# Patient Record
Sex: MALE | Race: BLACK OR AFRICAN AMERICAN
[De-identification: names, ages, dates, MRNs, and addresses within clinical notes are randomized per-mention and may not be internally consistent; named-entity substitution may affect disease eponyms.]

---

## 2017-08-29 ENCOUNTER — HOSPITAL ENCOUNTER (EMERGENCY)
Dept: HOSPITAL 76 - ED | Age: 42
Discharge: HOME | End: 2017-08-29
Payer: COMMERCIAL

## 2017-08-29 VITALS — SYSTOLIC BLOOD PRESSURE: 158 MMHG | DIASTOLIC BLOOD PRESSURE: 98 MMHG

## 2017-08-29 DIAGNOSIS — Z96.659: ICD-10-CM

## 2017-08-29 DIAGNOSIS — R03.0: ICD-10-CM

## 2017-08-29 DIAGNOSIS — M06.9: ICD-10-CM

## 2017-08-29 DIAGNOSIS — M25.512: Primary | ICD-10-CM

## 2017-08-29 PROCEDURE — 96372 THER/PROPH/DIAG INJ SC/IM: CPT

## 2017-08-29 PROCEDURE — 99283 EMERGENCY DEPT VISIT LOW MDM: CPT

## 2017-08-29 NOTE — ED PHYSICIAN DOCUMENTATION
PD HPI UPPER EXT INJURY





- Stated complaint


Stated Complaint: L ARM PAIN





- Chief complaint


Chief Complaint: Ext Problem





- History obtained from


History obtained from: Patient





- Additonal information


Additional information: 





42-year-old gentleman with history of mild intermittent rheumatoid arthritis 

presents with atraumatic left shoulder pain of 2 days duration.  It is similar 

to previous exacerbation of rheumatoid arthritis.  He has severe pain with 

range of motion.  There is no fever associated with this.  He does feel like 

his hand and wrist are swollen as well.





Review of Systems


Constitutional: denies: Fever, Chills, Fatigue, Weight Loss


Respiratory: denies: Dyspnea, Cough


GI: denies: Abdominal Pain





PD PAST MEDICAL HISTORY





- Past Medical History


Past Medical History: No


Musculoskeletal: Osteoarthritis, Rheumatoid arthritis, Gout





- Past Surgical History


Past Surgical History: Yes


Ortho: Knee replacement





- Present Medications


Home Medications: 


 Ambulatory Orders











 Medication  Instructions  Recorded  Confirmed


 


Ibuprofen 600 mg PO Q6HR PRN #30 tablet 01/30/16 08/29/17














- Allergies


Allergies/Adverse Reactions: 


 Allergies











Allergy/AdvReac Type Severity Reaction Status Date / Time


 


No Known Drug Allergies Allergy   Verified 08/29/17 21:29














- Social History


Does the pt smoke?: No


Smoking Status: Never smoker


Does the pt drink ETOH?: No


Does the pt have substance abuse?: No





- Immunizations


Immunizations are current?: Yes





- POLST


Patient has POLST: No





PD ED PE NORMAL





- Vitals


Vital signs reviewed: Yes





- General


General: Alert and oriented X 3, No acute distress





- Neck


Neck: Supple, no meningeal sign, No bony TTP





- Extremities


Extremities: Other (Diffuse tenderness To the left shoulder joint without 

warmth.  No overlying skin changes or redness.  He can internally and 

externally rotate a few degrees without pain but cannot really abduct at all.  

I do not really see any swelling to the arm per se and he has no tenderness of 

the hand or wrist.)





- Neuro


Neuro: Alert and oriented X 3, Normal speech





- Psych


Psych: Normal mood, Normal affect





Results





- Vitals


Vitals: 


 Vital Signs - 24 hr











  08/29/17





  21:30


 


Temperature 36.6 C


 


Heart Rate 98


 


Respiratory 18





Rate 


 


Blood Pressure 176/98 H


 


O2 Saturation 98








 Oxygen











O2 Source                      Room air

















PD MEDICAL DECISION MAKING





- ED course


ED course: 





42-year-old gentleman with an exacerbation of chronic arthritis, there is no 

evidence of infection and there was no trauma.  He requests a Toradol shot 

along with prednisone taper and some pain medication.





Departure





- Departure


Disposition: 01 Home, Self Care


Clinical Impression: 


Shoulder pain, left


Qualifiers:


 Chronicity: acute Qualified Code(s): M25.512 - Pain in left shoulder


Condition: Good


Record reviewed to determine appropriate education?: Yes


Instructions:  Arthritis Rheumatoid


Comments: 


Call your doctor to arrange a follow-up appointment, make the next available 

appointment.  In the interim, return anytime if worse or if new symptoms 

develop.





Your blood pressure was elevated today on check into the emergency department.  

This does not mean that you have hypertension, it is a common phenomenon to 

come to the emergency department and have elevated blood pressure.  I recommend 

that she see your primary care physician within the week to have it rechecked 

when you are feeling better.

## 2017-11-13 ENCOUNTER — HOSPITAL ENCOUNTER (EMERGENCY)
Dept: HOSPITAL 76 - ED | Age: 42
Discharge: HOME | End: 2017-11-13
Payer: COMMERCIAL

## 2017-11-13 VITALS — DIASTOLIC BLOOD PRESSURE: 103 MMHG | SYSTOLIC BLOOD PRESSURE: 171 MMHG

## 2017-11-13 DIAGNOSIS — M19.90: ICD-10-CM

## 2017-11-13 DIAGNOSIS — M79.662: ICD-10-CM

## 2017-11-13 DIAGNOSIS — M06.9: ICD-10-CM

## 2017-11-13 DIAGNOSIS — M10.9: ICD-10-CM

## 2017-11-13 DIAGNOSIS — M25.571: Primary | ICD-10-CM

## 2017-11-13 PROCEDURE — 73610 X-RAY EXAM OF ANKLE: CPT

## 2017-11-13 PROCEDURE — 93971 EXTREMITY STUDY: CPT

## 2017-11-13 PROCEDURE — 99283 EMERGENCY DEPT VISIT LOW MDM: CPT

## 2017-11-13 NOTE — XRAY PRELIMINARY REPORT
Accession: [id number]

Exam: XR ANKLE 3 VIEW RT

 

IMPRESSION: 

1. Heel spurs. 

2. DJD.

 

[facility]

 

SITE ID: [id number]

## 2017-11-13 NOTE — ULTRASOUND REPORT
EXAM:

LEFT LOWER EXTREMITY VENOUS ULTRASOUND 

 

EXAM DATE: 11/13/2017 07:13 AM.

 

CLINICAL HISTORY: Left calf pain.

 

COMPARISON: None.

 

TECHNIQUE: Real-time sonographic vascular imaging was performed by the sonographer through the lower 
extremity utilizing both color-flow and Doppler spectral analysis. Multiple representative static keyanna
ges were saved for review.

 

FINDINGS: 

Common Femoral Vein (CFV): Normal.

CFV-GSV Junction: Normal.

Profunda Femoral Vein (PFV): Normal.

Femoral Vein (FV) Prox: Normal.

Femoral Vein (FV) Mid: Normal.

Femoral Vein (FV) Dist: Normal.

Popliteal Vein: Normal.

Posterior Tibial Veins: Normal.

Peroneal Veins: Normal.

Contralateral Side CFV: Normal.

 

Other: None.

 

IMPRESSION: No evidence for deep venous thrombosis.

 

[facility]

Referring Provider Line: [phone]

 

SITE ID: [id number]

## 2017-11-13 NOTE — XRAY REPORT
EXAM:

RIGHT ANKLE RADIOGRAPHY

 

EXAM DATE: 11/13/2017 07:14 AM.

 

CLINICAL HISTORY: Right ankle pain.

 

COMPARISON: None.

 

TECHNIQUE: 3 views.

 

FINDINGS: 

Bones: Achilles tendon heel spur. Plantar heel spur. Loose bodies at the tibial talar joint. No acute
 fractures or bone lesions.

 

Joints: Joint space narrowing along the posterior talocalcaneal joint. Joint space narrowing along th
e distal aspect of the medial and lateral joint space. Accessory ossicles along the medial lateral ma
lleolus. Osteophyte along the talonavicular and navicular cuneiform joints.

 

Soft Tissues:   soft tissue swelling.

 

IMPRESSION: 

1. Heel spurs. 

2. DJD.

 

[facility]

Referring Provider Line: [phone]

 

SITE ID: [id number]

## 2017-11-13 NOTE — ED PHYSICIAN DOCUMENTATION
PD HPI LOWER EXT INJURY





- Stated complaint


Stated Complaint: BILAT LEG/ANKLE PX





- Chief complaint


Chief Complaint: Ext Problem





- History obtained from


History obtained from: Patient





- History of Present Illness


PD HPI LOW EXT INJURY LOCATION: Left, Lower leg


Type of injury: Other (No specific injury.)


Timing - onset: How many days ago (Onset was 5 or 6 days ago.)


Timing - details: Gradual onset, Waxing and waning


Worsened by: Other (Weight bearing.)





- Additional information


Additional information: 


The patient is a 42-year-old male of large stature, who presents with left 

lower leg pain that started 5 or 6 days ago and has been waxing and waning 

since that time.  He also complains of right ankle pain that started last night

, and is worse with weightbearing.  He denies any recent injury.  He has 

noticed slight swelling in his left lower leg.  He denies fever or shortness of 

breath.  He has a history of osteoarthritis and rheumatoid arthritis, as well 

as gout.  He has been using Advil without relief.








Review of Systems


Constitutional: denies: Fever


Nose: denies: Congestion


Throat: denies: Sore throat


Cardiac: denies: Chest pain / pressure


Respiratory: denies: Dyspnea, Cough


GI: denies: Abdominal Pain, Nausea, Vomiting


: denies: Dysuria


Skin: denies: Rash


Musculoskeletal: reports: Extremity pain.  denies: Neck pain, Back pain


Neurologic: denies: Focal weakness, Numbness, Headache





PD PAST MEDICAL HISTORY





- Past Medical History


Cardiovascular: None


Respiratory: None


Neuro: None


Endocrine/Autoimmune: None


Musculoskeletal: Osteoarthritis, Rheumatoid arthritis, Gout





- Past Surgical History


Past Surgical History: Yes


Ortho: Knee replacement


Cardiovascular: Vascular surgery





- Present Medications


Home Medications: 


 Ambulatory Orders











 Medication  Instructions  Recorded  Confirmed


 


Ibuprofen 600 mg PO Q6HR PRN #30 tablet 01/30/16 11/13/17


 


Naproxen [Naprosyn] 500 mg PO BID PRN #30 tablet 11/13/17 














- Allergies


Allergies/Adverse Reactions: 


 Allergies











Allergy/AdvReac Type Severity Reaction Status Date / Time


 


No Known Drug Allergies Allergy   Verified 11/13/17 06:06














- Social History


Does the pt smoke?: No


Smoking Status: Never smoker


Does the pt drink ETOH?: No


Does the pt have substance abuse?: No





- Immunizations


Immunizations are current?: Yes





- POLST


Patient has POLST: No





PD ED PE NORMAL





- Vitals


Vital signs reviewed: Yes (initially hypertensive.)





- General


General: Alert and oriented X 3, Well developed/nourished





- HEENT


HEENT: Atraumatic, Pharynx benign





- Neck


Neck: No adenopathy, No JVD





- Cardiac


Cardiac: RRR, No murmur





- Respiratory


Respiratory: No respiratory distress, Clear bilaterally





- Abdomen


Abdomen: Normal bowel sounds, Soft, Non tender





- Back


Back: No CVA TTP





- Derm


Derm: No rash





- Extremities


Extremities: Other





- Neuro


Neuro: Alert and oriented X 3, No motor deficit, No sensory deficit





Results





- Vitals


Vitals: 


 Oxygen











O2 Source                      Room air

















- Rads (name of study)


  ** Venous Duplex Left LE


Radiology: Prelim report reviewed, See rad report (No evidence for DVT.)





  ** Right ankle


Radiology: Prelim report reviewed, EMP read contemporaneously, See rad report (

Heel spurs.  DJD.)





PD MEDICAL DECISION MAKING





- ED course


Complexity details: reviewed results, re-evaluated patient, considered 

differential, d/w patient, d/w family


ED course: 





The patient's lower extremity pain is most likely caused by his arthritis.  The 

change in weather may be affecting the inflammatory condition.  There is no 

evidence of bony abnormality on x-ray examination of his right ankle.  There is 

no DVT detected on venous duplex scan of the left lower extremity.


Treatment in the emergency department included administration of Vicodin one 

tablet orally.  He is being discharged with prescription for Naprosyn.  I 

discussed with him and his wife the results of the imaging studies, symptomatic 

treatment and outpatient follow-up, as well as potentially worrisome signs or 

symptoms that should prompt reevaluation in the emergency department





Departure





- Departure


Disposition: 01 Home, Self Care


Clinical Impression: 


 History of inflammatory arthritis





Pain of lower extremity


Qualifiers:


 Laterality: bilateral Qualified Code(s): M79.604 - Pain in right leg





Condition: Stable


Instructions:  ED Arthritis Rheumatoid


Follow-Up: 


Murray Allen MD [Primary Care Provider] - 


Prescriptions: 


Naproxen [Naprosyn] 500 mg PO BID PRN #30 tablet


 PRN Reason: Pain


Comments: 


Keep your legs elevated as much of the time as possible.


You can use Naprosyn twice daily as prescribed if needed for pain.


Let pain be your guide to activity level.


Follow up with your primary physician within 1-2 weeks.  Call to schedule an 

appointment.


Return to the emergency department if you develop increasing pain or swelling 

in your legs, or otherwise worsening symptoms.


Discharge Date/Time: 11/13/17 07:50

## 2017-11-13 NOTE — ULTRASOUND PRELIMINARY REPORT
Accession: [id number]

Exam: US DUPLEX EXT VEINS LEFT

 

IMPRESSION: No evidence for deep venous thrombosis.

 

[facility]

 

SITE ID: [id number]

## 2017-11-22 ENCOUNTER — HOSPITAL ENCOUNTER (EMERGENCY)
Dept: HOSPITAL 76 - ED | Age: 42
Discharge: HOME | End: 2017-11-22
Payer: COMMERCIAL

## 2017-11-22 VITALS — SYSTOLIC BLOOD PRESSURE: 160 MMHG | DIASTOLIC BLOOD PRESSURE: 107 MMHG

## 2017-11-22 DIAGNOSIS — K42.9: Primary | ICD-10-CM

## 2017-11-22 DIAGNOSIS — Z96.659: ICD-10-CM

## 2017-11-22 PROCEDURE — 99283 EMERGENCY DEPT VISIT LOW MDM: CPT

## 2017-11-22 NOTE — ED PHYSICIAN DOCUMENTATION
PD HPI ABD PAIN





- Stated complaint


Stated Complaint: ABDOMINAL PAIN





- Chief complaint


Chief Complaint: Abd Pain





- History obtained from


History obtained from: Patient





- History of Present Illness


Timing - onset: How many days ago (several days)


Timing - duration: Days


Timing - details: Gradual onset, Waxing and waning


Quality: Aching, Dull, Pain


Location: Periumbilical


Radiation: No: Chest, Lower back, Left flank, Right flank


Improved by: Laying still.  No: Eating, BM


Worsened by: Moving, Palpation.  No: Eating, Breathing


Associated symptoms: No: Fever, Nausea, Vomiting, Diarrhea, Constipation, Melena


Similar symptoms before: Has not had sx before


Recently seen: Emergency Dept (he had been having lower leg pain and seen in ED 

with U/S and labs; no DVT and was Dx with likely arthritis. He felt better 

after just couple days of Naproxen and minimal pain meds. No recent pains in 

legs.)





Review of Systems


Constitutional: denies: Fever, Chills


Respiratory: denies: Cough


GI: reports: Abdominal Pain.  denies: Nausea, Vomiting, Constipation, Diarrhea, 

Bloody / black stool


: denies: Dysuria, Frequency


Skin: denies: Rash, Lesions





PD PAST MEDICAL HISTORY





- Past Medical History


Past Medical History: Yes


Cardiovascular: None


Respiratory: None


Neuro: None


Endocrine/Autoimmune: None


Musculoskeletal: Osteoarthritis, Rheumatoid arthritis, Gout





- Past Surgical History


Past Surgical History: Yes


Ortho: Knee replacement


Cardiovascular: Vascular surgery





- Present Medications


Home Medications: 


 Ambulatory Orders











 Medication  Instructions  Recorded  Confirmed


 


Ibuprofen 600 mg PO Q6HR PRN #30 tablet 01/30/16 11/13/17


 


Naproxen [Naprosyn] 500 mg PO BID PRN #30 tablet 11/13/17 














- Allergies


Allergies/Adverse Reactions: 


 Allergies











Allergy/AdvReac Type Severity Reaction Status Date / Time


 


No Known Drug Allergies Allergy   Verified 11/22/17 06:07














- Social History


Does the pt smoke?: No


Smoking Status: Never smoker


Does the pt drink ETOH?: No


Does the pt have substance abuse?: No





- Immunizations


Immunizations are current?: Yes





- POLST


Patient has POLST: No





PD ED PE NORMAL





- Vitals


Vital signs reviewed: Yes





- General


General: Alert and oriented X 3, No acute distress, Well developed/nourished, 

Other (very large stature.)





- Cardiac


Cardiac: RRR, No murmur





- Respiratory


Respiratory: Clear bilaterally





- Abdomen


Abdomen: Normal bowel sounds, Non distended, No organomegaly, Other (obese; has 

tenderness at umbilicus with palpable hernia. Pushing the hernia augments the 

pain, but the hernia is soft and reducible, also less prominent with him lying 

down. So easily reducible. )





- Male 


Male : Deferred





- Rectal


Rectal: Deferred





- Back


Back: No CVA TTP





- Derm


Derm: Normal color, No rash





Results





- Vitals


Vitals: 


 Vital Signs - 24 hr











  11/22/17





  05:58


 


Temperature 36.1 C L


 


Heart Rate 92


 


Respiratory 18





Rate 


 


Blood Pressure 160/107 H


 


O2 Saturation 97








 Oxygen











O2 Source                      Room air

















PD MEDICAL DECISION MAKING





- ED course


Complexity details: considered differential (having umbilical hernia 

tenderness. No findings/symptoms to suggest need for imaging/testing at this 

time. Refer for surgical clinic outpatient. Stool softener. Talked to him about 

symptoms of incarcerated hernia and when to return promptly. ), d/w patient





Departure





- Departure


Disposition: 01 Home, Self Care


Clinical Impression: 


 Umbilical pain





Umbilical hernia


Qualifiers:


 Obstruction and gangrene presence: without obstruction or gangrene Qualified 

Code(s): K42.9 - Umbilical hernia without obstruction or gangrene





Condition: Stable


Record reviewed to determine appropriate education?: Yes


Instructions:  ED Hernia Inguinal


Follow-Up: 


Murray Allen MD [Primary Care Provider] - 


Moy Lang MD [Provider Admit Priv/Credential] - 


Comments: 


Drink lots of fluids.  Naproxen or Tylenol if needed for pains.  Daily stool 

softener to ensure less pressure in the abdomen.  Avoid heavy lifting.  Follow-

up with surgery to discuss potential repair of the hernia.  Return if that has 

worsened pain, is hard and tender, has associated nausea or vomiting or bloody 

stool. Instructions given refer to inguinal hernia but the reasons for 

returning and cautions are the same and we just do not have particular 

instructions for umbilical hernia.


Discharge Date/Time: 11/22/17 06:35

## 2018-01-07 ENCOUNTER — HOSPITAL ENCOUNTER (EMERGENCY)
Dept: HOSPITAL 76 - ED | Age: 43
Discharge: HOME | End: 2018-01-07
Payer: COMMERCIAL

## 2018-01-07 VITALS — SYSTOLIC BLOOD PRESSURE: 162 MMHG | DIASTOLIC BLOOD PRESSURE: 98 MMHG

## 2018-01-07 DIAGNOSIS — I10: ICD-10-CM

## 2018-01-07 DIAGNOSIS — M19.90: ICD-10-CM

## 2018-01-07 DIAGNOSIS — M10.232: Primary | ICD-10-CM

## 2018-01-07 PROCEDURE — 96372 THER/PROPH/DIAG INJ SC/IM: CPT

## 2018-01-07 PROCEDURE — 99283 EMERGENCY DEPT VISIT LOW MDM: CPT

## 2018-01-07 NOTE — ED PHYSICIAN DOCUMENTATION
History of Present Illness





- Stated complaint


Stated Complaint: LEFT WRIST SWOLLEN





- Chief complaint


Chief Complaint: Ext Problem





- History obtained from


History obtained from: Patient, Family





- History of Present Illness


Timing: How many days ago (2)





- Additonal information


Additional information: 





42-year-old male has had spontaneous swelling of his left wrist with sharp 

exquisite pain.  He has had similar episodes previously in an ankle and in the 

thumb.  These have lasted a short period of time and resolved.  He did have 

exacerbation of this pain in his left wrist 10 days ago that lasted about 3 

days.  He has had a recurrence of this beginning 2 days ago.  He is in the 

process of being evaluated for rheumatoid arthritis.  He has not been diagnosed 

with gout previously.  He has involvement of a single joint with each of these 

prior episodes.  He does not know of any injury to the wrist prior to the onset 

of symptoms.  He has not been able to sleep.





Review of Systems


Constitutional: denies: Fever, Chills, Myalgias


Eyes: denies: Decreased vision


Ears: denies: Ear pain


Nose: denies: Congestion


Throat: denies: Sore throat


Cardiac: denies: Chest pain / pressure


Respiratory: denies: Dyspnea, Cough


GI: denies: Nausea, Vomiting


: denies: Dysuria


Skin: denies: Rash


Musculoskeletal: reports: Extremity pain, Joint pain, Joint swelling.  denies: 

Neck pain, Back pain


Neurologic: denies: Generalized weakness, Focal weakness, Numbness





PD PAST MEDICAL HISTORY





- Past Medical History


Past Medical History: Yes


Cardiovascular: Hypertension


Respiratory: None


Neuro: None


Endocrine/Autoimmune: None


Musculoskeletal: Osteoarthritis





- Past Surgical History


Past Surgical History: Yes


Ortho: Knee replacement


Cardiovascular: Vascular surgery





- Present Medications


Home Medications: 


 Ambulatory Orders











 Medication  Instructions  Recorded  Confirmed


 


HYDROcod/ACETAM 5/325 [Norco 5/325] 1 - 2 ea PO Q6H PRN #15 tablet 01/07/18 


 


Indomethacin 25 mg PO Q6HR PRN #30 capsule 01/07/18 


 


Lisinopril/Hydrochlorothiazide 1 tab ORAL DAILY 01/07/18 01/07/18





[Lisinopril-Hctz 20-25 mg Tab]   














- Allergies


Allergies/Adverse Reactions: 


 Allergies











Allergy/AdvReac Type Severity Reaction Status Date / Time


 


No Known Drug Allergies Allergy   Verified 01/07/18 07:17














- Social History


Does the pt smoke?: No


Smoking Status: Never smoker


Does the pt drink ETOH?: No


Does the pt have substance abuse?: No





- Immunizations


Immunizations are current?: Yes





- POLST


Patient has POLST: No





PD ED PE NORMAL





- Vitals


Vital signs reviewed: Yes (Hypertensive)





- General


General: Alert and oriented X 3, No acute distress, Well developed/nourished





- HEENT


HEENT: Atraumatic, PERRL, EOMI





- Respiratory


Respiratory: No respiratory distress





- Derm


Derm: Normal color, Warm and dry, No rash





- Extremities


Extremities: Other (There is swelling to the left wrist specifically over the 

dorsum of the wrist joint itself.  There is no pain to movement of the thumb 

with isolation of the thumb or to movement of the fingers with isolation of the 

hand.  Remainder of the patient's joints do not appear affected.)





- Neuro


Neuro: Alert and oriented X 3, No motor deficit, No sensory deficit, Normal 

speech


Eye Opening: Spontaneous


Motor: Obeys Commands


Verbal: Oriented


GCS Score: 15





- Psych


Psych: Normal mood, Normal affect





Results





- Vitals


Vitals: 





 Vital Signs - 24 hr











  01/07/18





  07:13


 


Temperature 36 C L


 


Heart Rate 69


 


Respiratory 18





Rate 


 


Blood Pressure 179/97 H


 


O2 Saturation 97








 Oxygen











O2 Source                      Room air

















PD MEDICAL DECISION MAKING





- ED course


Complexity details: reviewed old records, reviewed results, re-evaluated patient

, considered differential, d/w patient


ED course: 





42-year-old male with a recurrent swelling and pain consistent with acute gouty 

arthritis.  In review of the patient's record he has had similar episodes 

previously.He has been diagnosed with gout previously. The patient is on 

hydrochlorothiazide.





Departure





- Departure


Disposition: 01 Home, Self Care


Clinical Impression: 


Gout


Qualifiers:


 Gout site: wrist Gout etiology: drug-induced Chronicity: acute Laterality: 

left Qualified Code(s): M10.232 - Drug-induced gout, left wrist





Condition: Stable


Instructions:  ED Arthritis Gout, ED Diet Gout


Follow-Up: 


Murray Allen MD [Primary Care Provider] - 


Prescriptions: 


Indomethacin 25 mg PO Q6HR PRN #30 capsule


 PRN Reason: Pain


HYDROcod/ACETAM 5/325 [Norco 5/325] 1 - 2 ea PO Q6H PRN #15 tablet


 PRN Reason: Pain


Comments: 


Today in the emergency department your blood pressure was elevated.  I do see 

you are on some hydrochlorothiazide with by lisinopril.  This medicine can 

exacerbate gout.  Talk to Dr. Allen about changing this medicine.

## 2019-12-01 ENCOUNTER — HOSPITAL ENCOUNTER (INPATIENT)
Dept: HOSPITAL 76 - ED | Age: 44
LOS: 3 days | Discharge: HOME | DRG: 638 | End: 2019-12-04
Attending: INTERNAL MEDICINE | Admitting: SPECIALIST
Payer: COMMERCIAL

## 2019-12-01 DIAGNOSIS — Z79.899: ICD-10-CM

## 2019-12-01 DIAGNOSIS — Z83.3: ICD-10-CM

## 2019-12-01 DIAGNOSIS — E86.0: ICD-10-CM

## 2019-12-01 DIAGNOSIS — N17.9: ICD-10-CM

## 2019-12-01 DIAGNOSIS — E78.5: ICD-10-CM

## 2019-12-01 DIAGNOSIS — I10: ICD-10-CM

## 2019-12-01 DIAGNOSIS — E11.10: Primary | ICD-10-CM

## 2019-12-01 DIAGNOSIS — M19.90: ICD-10-CM

## 2019-12-01 DIAGNOSIS — E87.5: ICD-10-CM

## 2019-12-01 DIAGNOSIS — T50.2X5A: ICD-10-CM

## 2019-12-01 DIAGNOSIS — E66.9: ICD-10-CM

## 2019-12-01 LAB
ALBUMIN DIAFP-MCNC: 4.8 G/DL (ref 3.2–5.5)
ALBUMIN/GLOB SERPL: 1.2 {RATIO} (ref 1–2.2)
ALP SERPL-CCNC: 79 IU/L (ref 42–121)
ALT SERPL W P-5'-P-CCNC: 42 IU/L (ref 10–60)
ANION GAP SERPL CALCULATED.4IONS-SCNC: 12 MMOL/L (ref 6–13)
ANION GAP SERPL CALCULATED.4IONS-SCNC: 13 MMOL/L (ref 6–13)
ANION GAP SERPL CALCULATED.4IONS-SCNC: 15 MMOL/L (ref 6–13)
ANION GAP SERPL CALCULATED.4IONS-SCNC: 19 MMOL/L (ref 6–13)
ANION GAP SERPL CALCULATED.4IONS-SCNC: 22 MMOL/L (ref 6–13)
ANION GAP SERPL CALCULATED.4IONS-SCNC: 23 MMOL/L (ref 6–13)
ANION GAP SERPL CALCULATED.4IONS-SCNC: 24 MMOL/L (ref 6–13)
ANION GAP SERPL CALCULATED.4IONS-SCNC: 25 MMOL/L (ref 6–13)
AST SERPL W P-5'-P-CCNC: 16 IU/L (ref 10–42)
BASE EXCESS BLDV CALC-SCNC: -10 MMOL/L
BASE EXCESS BLDV CALC-SCNC: -11.6 MMOL/L
BASOPHILS NFR BLD AUTO: 0 10^3/UL (ref 0–0.1)
BASOPHILS NFR BLD AUTO: 0.2 %
BILIRUB BLD-MCNC: 1.8 MG/DL (ref 0.2–1)
BUN SERPL-MCNC: 45 MG/DL (ref 6–20)
BUN SERPL-MCNC: 49 MG/DL (ref 6–20)
BUN SERPL-MCNC: 51 MG/DL (ref 6–20)
BUN SERPL-MCNC: 51 MG/DL (ref 6–20)
BUN SERPL-MCNC: 52 MG/DL (ref 6–20)
BUN SERPL-MCNC: 52 MG/DL (ref 6–20)
BUN SERPL-MCNC: 53 MG/DL (ref 6–20)
BUN SERPL-MCNC: 54 MG/DL (ref 6–20)
CALCIUM UR-MCNC: 10 MG/DL (ref 8.5–10.3)
CALCIUM UR-MCNC: 10.2 MG/DL (ref 8.5–10.3)
CALCIUM UR-MCNC: 10.3 MG/DL (ref 8.5–10.3)
CALCIUM UR-MCNC: 10.5 MG/DL (ref 8.5–10.3)
CALCIUM UR-MCNC: 9.7 MG/DL (ref 8.5–10.3)
CALCIUM UR-MCNC: 9.8 MG/DL (ref 8.5–10.3)
CHLORIDE SERPL-SCNC: 82 MMOL/L (ref 101–111)
CHLORIDE SERPL-SCNC: 85 MMOL/L (ref 101–111)
CHLORIDE SERPL-SCNC: 87 MMOL/L (ref 101–111)
CHLORIDE SERPL-SCNC: 91 MMOL/L (ref 101–111)
CHLORIDE SERPL-SCNC: 91 MMOL/L (ref 101–111)
CHLORIDE SERPL-SCNC: 93 MMOL/L (ref 101–111)
CHLORIDE SERPL-SCNC: 96 MMOL/L (ref 101–111)
CHLORIDE SERPL-SCNC: 97 MMOL/L (ref 101–111)
CLARITY UR REFRACT.AUTO: CLEAR
CO2 BLDV-SCNC: 15.3 MMOL/L (ref 24–29)
CO2 BLDV-SCNC: 16.9 MMOL/L (ref 24–29)
CO2 SERPL-SCNC: 15 MMOL/L (ref 21–32)
CO2 SERPL-SCNC: 17 MMOL/L (ref 21–32)
CO2 SERPL-SCNC: 19 MMOL/L (ref 21–32)
CO2 SERPL-SCNC: 22 MMOL/L (ref 21–32)
CO2 SERPL-SCNC: 23 MMOL/L (ref 21–32)
CO2 SERPL-SCNC: 24 MMOL/L (ref 21–32)
CREAT SERPLBLD-SCNC: 1.4 MG/DL (ref 0.6–1.2)
CREAT SERPLBLD-SCNC: 1.7 MG/DL (ref 0.6–1.2)
CREAT SERPLBLD-SCNC: 1.8 MG/DL (ref 0.6–1.2)
CREAT SERPLBLD-SCNC: 1.9 MG/DL (ref 0.6–1.2)
CREAT SERPLBLD-SCNC: 1.9 MG/DL (ref 0.6–1.2)
CREAT SERPLBLD-SCNC: 2 MG/DL (ref 0.6–1.2)
EOSINOPHIL # BLD AUTO: 0 10^3/UL (ref 0–0.7)
EOSINOPHIL NFR BLD AUTO: 0.3 %
ERYTHROCYTE [DISTWIDTH] IN BLOOD BY AUTOMATED COUNT: 13 % (ref 12–15)
EST. AVERAGE GLUCOSE BLD GHB EST-MCNC: 318 MG/DL (ref 70–100)
GFRSERPLBLD MDRD-ARVRAT: 44 ML/MIN/{1.73_M2} (ref 89–?)
GFRSERPLBLD MDRD-ARVRAT: 47 ML/MIN/{1.73_M2} (ref 89–?)
GFRSERPLBLD MDRD-ARVRAT: 47 ML/MIN/{1.73_M2} (ref 89–?)
GFRSERPLBLD MDRD-ARVRAT: 50 ML/MIN/{1.73_M2} (ref 89–?)
GFRSERPLBLD MDRD-ARVRAT: 53 ML/MIN/{1.73_M2} (ref 89–?)
GFRSERPLBLD MDRD-ARVRAT: 66 ML/MIN/{1.73_M2} (ref 89–?)
GLOBULIN SER-MCNC: 4.1 G/DL (ref 2.1–4.2)
GLUCOSE SERPL-MCNC: 185 MG/DL (ref 70–100)
GLUCOSE SERPL-MCNC: 281 MG/DL (ref 70–100)
GLUCOSE SERPL-MCNC: 404 MG/DL (ref 70–100)
GLUCOSE SERPL-MCNC: 537 MG/DL (ref 70–100)
GLUCOSE SERPL-MCNC: 704 MG/DL (ref 70–100)
GLUCOSE SERPL-MCNC: 828 MG/DL (ref 70–100)
GLUCOSE SERPL-MCNC: 931 MG/DL (ref 70–100)
GLUCOSE SERPL-MCNC: 982 MG/DL (ref 70–100)
GLUCOSE UR QL STRIP.AUTO: >=1000 MG/DL
HB2 TOTAL: 14.9 G/DL
HBA1C BLD-MCNC: 1.72 G/DL
HEMOGLOBIN A1C %: 12.7 % (ref 4.6–6.2)
HGB UR QL STRIP: 14.9 G/DL (ref 14–18)
KETONES SERPL STRIP-SCNC: (no result) MMOL/L
KETONES UR QL STRIP.AUTO: 40 MG/DL
LIPASE SERPL-CCNC: 56 U/L (ref 22–51)
LYMPHOCYTES # SPEC AUTO: 1.4 10^3/UL (ref 1.5–3.5)
LYMPHOCYTES NFR BLD AUTO: 24.3 %
MAGNESIUM SERPL-MCNC: 3.1 MG/DL (ref 1.7–2.8)
MAGNESIUM SERPL-MCNC: 3.4 MG/DL (ref 1.7–2.8)
MAGNESIUM SERPL-MCNC: 3.5 MG/DL (ref 1.7–2.8)
MCH RBC QN AUTO: 29.2 PG (ref 27–31)
MCHC RBC AUTO-ENTMCNC: 33.6 G/DL (ref 32–36)
MCV RBC AUTO: 87.1 FL (ref 80–94)
MONOCYTES # BLD AUTO: 0.4 10^3/UL (ref 0–1)
MONOCYTES NFR BLD AUTO: 6.4 %
NEUTROPHILS # BLD AUTO: 3.9 10^3/UL (ref 1.5–6.6)
NEUTROPHILS # SNV AUTO: 5.8 X10^3/UL (ref 4.8–10.8)
NEUTROPHILS NFR BLD AUTO: 68.3 %
NITRITE UR QL STRIP.AUTO: NEGATIVE
PCO2 BLDV: 32.9 MMHG (ref 41–51)
PCO2 BLDV: 35.1 MMHG (ref 41–51)
PDW BLD AUTO: 12.2 FL (ref 7.4–11.4)
PH BLDV: 7.26 [PH] (ref 7.31–7.41)
PH BLDV: 7.27 [PH] (ref 7.31–7.41)
PH UR STRIP.AUTO: 5.5 PH (ref 5–7.5)
PLATELET # BLD: 281 10^3/UL (ref 130–450)
PO2 BLDV: 35.2 MMHG (ref 25–47)
PO2 BLDV: 42.2 MMHG (ref 25–47)
PROT SPEC-MCNC: 8.9 G/DL (ref 6.7–8.2)
PROT UR STRIP.AUTO-MCNC: NEGATIVE MG/DL
RBC # UR STRIP.AUTO: NEGATIVE /UL
RBC MAR: 5.1 10^6/UL (ref 4.7–6.1)
SODIUM SERPLBLD-SCNC: 121 MMOL/L (ref 135–145)
SODIUM SERPLBLD-SCNC: 125 MMOL/L (ref 135–145)
SODIUM SERPLBLD-SCNC: 125 MMOL/L (ref 135–145)
SODIUM SERPLBLD-SCNC: 129 MMOL/L (ref 135–145)
SODIUM SERPLBLD-SCNC: 130 MMOL/L (ref 135–145)
SODIUM SERPLBLD-SCNC: 130 MMOL/L (ref 135–145)
SODIUM SERPLBLD-SCNC: 132 MMOL/L (ref 135–145)
SODIUM SERPLBLD-SCNC: 133 MMOL/L (ref 135–145)
SP GR UR STRIP.AUTO: <=1.005 (ref 1–1.03)
UROBILINOGEN UR QL STRIP.AUTO: (no result) E.U./DL
UROBILINOGEN UR STRIP.AUTO-MCNC: NEGATIVE MG/DL

## 2019-12-01 PROCEDURE — 99285 EMERGENCY DEPT VISIT HI MDM: CPT

## 2019-12-01 PROCEDURE — 85025 COMPLETE CBC W/AUTO DIFF WBC: CPT

## 2019-12-01 PROCEDURE — 84132 ASSAY OF SERUM POTASSIUM: CPT

## 2019-12-01 PROCEDURE — 93005 ELECTROCARDIOGRAM TRACING: CPT

## 2019-12-01 PROCEDURE — 81001 URINALYSIS AUTO W/SCOPE: CPT

## 2019-12-01 PROCEDURE — 82803 BLOOD GASES ANY COMBINATION: CPT

## 2019-12-01 PROCEDURE — 83690 ASSAY OF LIPASE: CPT

## 2019-12-01 PROCEDURE — 80048 BASIC METABOLIC PNL TOTAL CA: CPT

## 2019-12-01 PROCEDURE — 36415 COLL VENOUS BLD VENIPUNCTURE: CPT

## 2019-12-01 PROCEDURE — 84100 ASSAY OF PHOSPHORUS: CPT

## 2019-12-01 PROCEDURE — 82009 KETONE BODYS QUAL: CPT

## 2019-12-01 PROCEDURE — 87086 URINE CULTURE/COLONY COUNT: CPT

## 2019-12-01 PROCEDURE — 82040 ASSAY OF SERUM ALBUMIN: CPT

## 2019-12-01 PROCEDURE — 80053 COMPREHEN METABOLIC PANEL: CPT

## 2019-12-01 PROCEDURE — 36556 INSERT NON-TUNNEL CV CATH: CPT

## 2019-12-01 PROCEDURE — 83036 HEMOGLOBIN GLYCOSYLATED A1C: CPT

## 2019-12-01 PROCEDURE — 87150 DNA/RNA AMPLIFIED PROBE: CPT

## 2019-12-01 PROCEDURE — 71045 X-RAY EXAM CHEST 1 VIEW: CPT

## 2019-12-01 PROCEDURE — 82947 ASSAY GLUCOSE BLOOD QUANT: CPT

## 2019-12-01 PROCEDURE — 83735 ASSAY OF MAGNESIUM: CPT

## 2019-12-01 PROCEDURE — 81003 URINALYSIS AUTO W/O SCOPE: CPT

## 2019-12-01 RX ADMIN — ENOXAPARIN SODIUM SCH MG: 100 INJECTION SUBCUTANEOUS at 12:53

## 2019-12-01 RX ADMIN — SODIUM CHLORIDE, PRESERVATIVE FREE SCH ML: 5 INJECTION INTRAVENOUS at 16:23

## 2019-12-01 RX ADMIN — DEXTROSE AND SODIUM CHLORIDE SCH: 5; 450 INJECTION, SOLUTION INTRAVENOUS at 19:49

## 2019-12-01 RX ADMIN — SODIUM CHLORIDE, PRESERVATIVE FREE SCH ML: 5 INJECTION INTRAVENOUS at 12:39

## 2019-12-01 RX ADMIN — INSULIN HUMAN SCH MLS/HR: 100 INJECTION, SOLUTION PARENTERAL at 09:32

## 2019-12-01 RX ADMIN — SODIUM CHLORIDE, PRESERVATIVE FREE PRN ML: 5 INJECTION INTRAVENOUS at 21:27

## 2019-12-01 RX ADMIN — SODIUM CHLORIDE SCH MLS/HR: 9 INJECTION, SOLUTION INTRAVENOUS at 10:53

## 2019-12-01 RX ADMIN — INSULIN HUMAN SCH MLS/HR: 100 INJECTION, SOLUTION PARENTERAL at 16:09

## 2019-12-01 RX ADMIN — INSULIN HUMAN SCH MLS/HR: 100 INJECTION, SOLUTION PARENTERAL at 20:19

## 2019-12-01 RX ADMIN — SODIUM CHLORIDE, PRESERVATIVE FREE PRN ML: 5 INJECTION INTRAVENOUS at 12:40

## 2019-12-01 RX ADMIN — SODIUM CHLORIDE SCH MLS/HR: 9 INJECTION, SOLUTION INTRAVENOUS at 18:13

## 2019-12-01 RX ADMIN — DEXTROSE AND SODIUM CHLORIDE SCH MLS/HR: 5; 450 INJECTION, SOLUTION INTRAVENOUS at 21:27

## 2019-12-01 RX ADMIN — SODIUM CHLORIDE, PRESERVATIVE FREE SCH ML: 5 INJECTION INTRAVENOUS at 11:53

## 2019-12-01 NOTE — ED PHYSICIAN DOCUMENTATION
ED Addendum





- Addendum


Addendum: 





12/01/19 09:26


Nursing staff notified me of critical results of potassium 6.0 and glucose 931. 

The patient's being admitted with DKA.  I have ordered an EKG.


12/01/19 09:27


He was given 10 units of insulin IV.  The patient Is already admitted to the 

medical service.  We will communicate these results with them.


12/01/19 10:04


EKG:  Sinus tachycardia, rate 101.  Narrow QRS, t wave inversion II, III aVF.  

No peaked t waves.

## 2019-12-01 NOTE — HISTORY & PHYSICAL EXAMINATION
Chief Complaint





- Chief Complaint


Chief Complaint: Increased thirst, urination and blurred vision 





History of Present Illness





- Admitted From


Admitted From:: Home 





- History Obtained From


History obtained from: Patient, patient wife and EHR 





- History of Present Illness


HPI Comment/Other: 





Mr. Webster is a 44 year old  w/ a PMH of hypertension and hyperlipidemia 

who presents today for progressively worsening polydipsia and polyuria over the 

course of a week. He had gone to see his primary doctor on Wednesday regarding 

this. Labs were drawn and he has not heard anything back. His symptoms were 

continuing to worsen, and he began to develop weakness and blurred vision so he 

decided to come to the ED. On arrival, blood glucose was 982. A VBG was checked 

and resulted as follows: pH: 7.27, CO2: 35 and HCO3: 15.8. He also had an anion 

gap of 24, a sodium of 121 and a creatinine of 2. He was admitted for diabetic 

ketoacidosis from a new diagnosis of diabetes mellitus type 2.   





History





- Past Medical History


Cardiovascular: reports: Hypertension, High cholesterol


Respiratory: reports: None


Neuro: reports: None


Endocrine/Autoimmune: reports: None


GI: reports: Other (Umbilical hernia)


GYN: reports: None


: reports: None


HEENT: reports: None


Musculoskeletal: reports: Osteoarthritis, Gout (Pt and wife didn't mention, but 

found hx in EHR)


Derm: reports: None


MRSA Hx?: No





- Past Surgical History


Ortho: reports: Knee replacement





- Family & Social History


Family History: Mother: Alive and Well, Diabetes, Type 2, Hypertension


Family History Comment/Other: Mr. Webster's mother still lives in [place] (his 

hometown). He has never met his father and knows nothing about him. He does not 

have siblings that he is aware of. He does have one child who has asthma.


Living arrangement: At home


Living Situation: With family


Social History Notes: Mr. Webster is a   who is originally from 

[place]. He and his wife met in [place], [place] before he was stationed in [facility]

nd they moved to [place] in 2005. Together they have 4 children, 3 of which 

are adopted from his wife's prior marriage. Even though he is retired from the 

[employer] he is the  at [employer], and works IT for an 

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





- Substance History


Use: Uses substance without health or social issues: NONE


Abuse: Recurrent use of substance despite neg consequences: NONE


Dependence: Experiences withdrawal or developed tolerances: NONE





- POLST


Patient has POLST: No


POLST Status: Full Code





Meds/Allgy





- Home Medications


Home Medications: 


                                Ambulatory Orders











 Medication  Instructions  Recorded  Confirmed


 


HYDROcod/ACETAM 5/325 [Norco 5/325] 1 - 2 ea PO Q6H PRN #15 tablet 01/07/18 


 


Indomethacin 25 mg PO Q6HR PRN #30 capsule 01/07/18 


 


Lisinopril/Hydrochlorothiazide 1 tab ORAL DAILY 01/07/18 01/07/18





[Lisinopril-Hctz 20-25 mg Tab]   














- Allergies


Allergies/Adverse Reactions: 


                                    Allergies











Allergy/AdvReac Type Severity Reaction Status Date / Time


 


No Known Drug Allergies Allergy   Verified 12/01/19 06:00














Review of Systems





- Constitutional


Constitutional: reports: Fatigue, Weakness, Poor appetite.  denies: Fever, 

Chills, Malaise, Diaphoresis, Night sweats, Weight gain





- Eyes


Eyes: reports: Blurred vision.  denies: Pain, Irritation, Amaurosis, Spots in 

vision, Field loss, Vision loss, Dipolpia





- Ears, Nose & Throat


Ears, Nose & Throat: denies: Ear pain, Hearing loss, Hearing aids, Tinnitus, 

Vertigo, Nasal pain, Nasal discharge, Nosebleeds, Nasal obstruction, Nasal 

congestion, Postnasal drainage, Dentures, Sore throat, Hoarseness, Mouth 

lesions, Bleeding gums, Dental decay





- Cardiovascular


Cariovascular: reports: Lightheadedness (This morning in the ED).  denies: 

Irregular heart rate, Palpitations, Chest pain, Edema, Exertional dyspnea, Decr.

exercise tolerance, Orthopnea





- Respiratory


Respiratory: denies: Cough, Sputum production, Wheezing, Hemoptysis, Orthopnea, 

SOB at rest, SOB with exertion, Apnea, Stridor, Pleuritic pain





- Gastrointestinal


Gastrointestinal: reports: Abdominal pain (Worse yesterday. On and off today.), 

Nausea (On and off over the course of a week), Other (Obese abdomen).  denies: 

Abdominal distention, Constipation, Diarrhea, Rectal bleeding, Black stools, 

Bloody stools, Vomiting, Bile emesis, Marvin blood emesis, Coffee grounds emesis,

Reflux/heartburn





- Genitourinary


Genitourinary: reports: Frequency, Other (Polydipsia).  denies: Hematuria, 

Incontinence, Flank pain





- Musculoskeletal


Musculoskeletal: reports: Joint pain (OA).  denies: Muscle pain, Back pain, 

Muscle aches, Stiffness, Limited range of motion, Muscle weakness





- Integumentary


Integumentary: denies: Rash, Pruritis, Lesions, Dryness, Lumps, Acne, Pigment 

changes, Nail changes, Hair changes





- Neurological


Neurological: reports: General weakness.  denies: Focal weakness, Headache, 

Dizziness, Numbness, Memory problems, Pre-existing deficit, Abnormal gait, 

Seizures, Incoordination, Slurred speech





- Psychiatric


Psychiatric: denies: Depression, Anxiety, Suicidal, Delusions, Hallucinations, 

Homicidal





- Endocrine


Endocrine: reports: Polyuria, Polydypsia





- Hematologic/Lymphatic


Hematologic/Lymphatic: denies: Anemia, Bruising, Petechiae, Blood clots, 

Lymphadenopathy, Bleeding tendencies





- All Other Systems


All Other Systems: reports: Reviewed and negative


Prior Level of Functionality: 





Prior to this, Mr. Webster was an independently functioning individual who drives, 

works full time, pays his own bills and provides for his wife and 4 children. 





Exam





- Vital Signs


Reviewed Vital Signs: Yes


Vital Signs: 





                                Vital Signs x48h











  Temp Pulse Pulse Resp BP BP Pulse Ox


 


 12/01/19 15:00    99  17   174/94 H  99


 


 12/01/19 14:00    104 H  17   156/90 H  99


 


 12/01/19 13:00    102 H  23   134/72 H  99


 


 12/01/19 12:00  36.4 C L   105 H  19   134/73 H  95


 


 12/01/19 11:00    108 H  18   97/70  98


 


 12/01/19 10:45  36.4 C L   109 H  12   92/77  100


 


 12/01/19 10:00   107 H   14  151/72 H   98


 


 12/01/19 09:53  36.6 C  104 H   25 H  139/87 H   97


 


 12/01/19 08:30   106 H   14  187/93 H   98


 


 12/01/19 08:00  36.3 C L  100   21  172/104 H   98














- Physical Exam


General Appearance: positive: No acute distress, Mild distress (Tachypneic (low 

20s).), Lethargic, Other (6'5" black male, well nourished, well developed at 

145Kg.)


Eyes Bilateral: positive: Normal inspection, No lid inflammation, Conjunctivae 

nml, No scleral icterus


ENT: positive: ENT inspection nml, Pharynx nml, Dry mucous membranes.  negative:

Purulent nasal drainage, Pharyngeal erythema, Oral lesions


Neck: positive: Nml inspection, Thyroid nml, No JVD, Trachea midline.  negative:

Stiff neck, Kernig's sign, Carotid bruit, Swelling/bruising, Tracheal deviation


Respiratory: positive: Chest non-tender, Breath sounds nml, Other (Mildly 

tachypneic)


Cardiovascular: positive: No murmur, No gallop, Tachycardia.  negative: JVD 

present, Systolic murmur, Diastolic murmur, Gallop/S3, Gallop/S4, Friction rub, 

Decreased pulse(s), Crepitus


Peripheral Pulses: positive: 2+


Abdomen: positive: Non-tender, Nml bowel sounds, Other (Obese abdomen).  

negative: Tenderness, Guarding, Rebound


Rectal: positive: Non-tender


Back: positive: Nml inspection, CVA tenderness (R), CVA tenderness (L)


Skin: positive: Color nml, No rash, Warm, Dry, Cyanosis.  negative: Diaphoresis,

Pallor, Skin rash, Decubitus, Laceration (cm), Puncture wound


Extremities: positive: Non-tender, Full ROM, Nml appearance, No pedal edema.  

negative: Pedal edema, Calf tenderness, Joint swelling


Neurologic/Psychiatric: positive: Oriented x3, CN's nml (2-12), Motor nml, Sensa

tion nml, Depressed mood/affect (Pt seems to be withdrawn, but he has not 

bounced back yet so this could be a result of his current state).  negative: 

Sensory loss, Facial droop, Slurred/abnml speech





Conclusion/Plan





- Problem List


(1) Diabetic ketoacidosis


Conclusion/Plan: 


Presented for blurred vision and weakness along with progressively worsening 

polydipsia and polyuria. Blood glucose on arrival was 982 > 704 > 537. His VBG 

resulted as follows: pH: 7.27, pCO2: 35.1, HCO3: 15.8. Was started on treatment 

for DKA. Checking CMP Q4h: anion gap 24 > 22 > 19, sodium 121 > 130 > 129, 

potassium 5.9 > 4.5 > 4.3. An EKG was checked because of hyperkalemia and showed

sinus tach, R axis deviation and borderline ST elevation. His corrected sodium 

is now 136 and K is WNL .  Will continue to follow DKA protocol.





-Follow CMP Q4H until stable. 


-Neuro checks 


-Remain NPO (can drink water, sugar free beverages, ice chips meds) until off of

insulin gtt  


Qualifiers: 


   Diabetes mellitus type: type 2   Diabetes mellitus complication detail: 

without coma   Qualified Code(s): E11.10 - Type 2 diabetes mellitus with 

ketoacidosis without coma   





(2) Newly diagnosed diabetes


Conclusion/Plan: 


AIC on admit was 12.7%. His mother has DM II as well. His wife was hoping that 

he would be able to eventually take pills to control his sugar, but given his 

obesity along with such a high A1C, he is unfortunately not a candidate. His 

wife feels overwhelmed about the new diagnosis, but the pt doesn't seemed to be 

phased by this. Reassured both the patient and the wife that they will receive 

diabetic education before discharging. 


-CMP Q4H 


-Transition to short- and long- term injections once stable and off gtt 


-Diabetic education 








(3) Acute kidney injury


Conclusion/Plan: 


Secondary to dehydration and DKA. BUN/creatinine: 54/2 > 51/1.9 > 5.1/1.9. 

Receiving aggressive hydration. 


-Continue IVF 


-Monitor CMP Q4h











(4) Hypertension


Conclusion/Plan: 


Takes Lisinopril/HCTZ at home. Holding now due to BRITTA 


-Will resume as needed  


Qualifiers: 


   Hypertension type: essential hypertension   Qualified Code(s): I10 - 

Essential (primary) hypertension   





(5) Hyperlipidemia


Conclusion/Plan: 


Was taking Atorvastatin up until a few weeks ago. He stopped because it was 

making his skin dry. His ASCVD risk is now especially high with his new dx of D

M. Will check lipid panel and have him f/u w/ PCP after D/C 


-Check lipid panel when sugar is controlled


Qualifiers: 


   Hyperlipidemia type: unspecified   Qualified Code(s): E78.5 - Hyperlipidemia,

unspecified   





(6) Personal history of osteoarthritis


Conclusion/Plan: 


Takes Indomethacin at home. Holding for now. 


-Continue PRN Oxy and APAP 








- Lab Results


Fish Bones: 


                                 12/01/19 07:25





                                 12/01/19 16:40





- EKG Results


EKG Interpreted Independently: No





Core Measures





- Anticipated LOS


I expect patient to be DC'd or transferred within 96 hours.: Yes





- DVT/VTE - Prophylaxis


VTE/DVT Device ordered at admit?: Yes

## 2019-12-01 NOTE — ED PHYSICIAN DOCUMENTATION
History of Present Illness





- Stated complaint


Stated Complaint: WEAK/THIRSTY/MALE 





- Chief complaint


Chief Complaint: Neuro





- History obtained from


History obtained from: Patient





- History of Present Illness


Timing: How many weeks ago (1)


Pain level max: 0


Pain level now: 0


Improved by: nothing


Worsened by: no apparent exacerbating factors


Associated symptoms: polyuria, polydipsia, blurry vision, generalized weakness





- Additonal information


Additional information: 





c/o 1 week of gradual onset, gradually worsening polyuria, polydipsia. He was 

evaluated at [facility] 4 days ago and had blood work but has not heard back from them 

regarding the results. Presents to ED due to ongoing and worsening symptoms, now

associated with blurry vision, nausea, generalized weakness





Review of Systems


Constitutional: reports: Myalgias, Fatigue.  denies: Fever, Chills, Sweats


Eyes: reports: Other (bilateral blurred vision).  denies: Loss of vision, 

Photophobia


Ears: reports: Reviewed and negative


Nose: reports: Reviewed and negative


Throat: reports: Reviewed and negative


Cardiac: reports: Reviewed and negative


Respiratory: reports: Reviewed and negative


GI: reports: Nausea.  denies: Abdominal Pain, Vomiting, Constipation, Diarrhea


: reports: Frequency.  denies: Dysuria


Skin: reports: Reviewed and negative


Musculoskeletal: reports: Reviewed and negative


Neurologic: reports: Generalized weakness.  denies: Focal weakness, Numbness, 

Headache


Endocrine: reports: Polydypsia, Polyuria





PD PAST MEDICAL HISTORY





- Past Medical History


Cardiovascular: Hypertension


Respiratory: None


Endocrine/Autoimmune: None


Musculoskeletal: Osteoarthritis





- Past Surgical History


Past Surgical History: Yes


Ortho: Knee replacement


Cardiovascular: Vascular surgery





- Present Medications


Home Medications: 


                                Ambulatory Orders











 Medication  Instructions  Recorded  Confirmed


 


HYDROcod/ACETAM 5/325 [Norco 5/325] 1 - 2 ea PO Q6H PRN #15 tablet 01/07/18 


 


Indomethacin 25 mg PO Q6HR PRN #30 capsule 01/07/18 


 


Lisinopril/Hydrochlorothiazide 1 tab ORAL DAILY 01/07/18 01/07/18





[Lisinopril-Hctz 20-25 mg Tab]   














- Allergies


Allergies/Adverse Reactions: 


                                    Allergies











Allergy/AdvReac Type Severity Reaction Status Date / Time


 


No Known Drug Allergies Allergy   Verified 12/01/19 06:00














- Social History


Does the pt smoke?: No


Smoking Status: Never smoker


Does the pt drink ETOH?: No


Does the pt have substance abuse?: No





- Immunizations


Immunizations are current?: Yes





- POLST


Patient has POLST: No





PD ED PE NORMAL





- Vitals


Vital signs reviewed: Yes





- General


General: Alert and oriented X 3, No acute distress, Well developed/nourished





- HEENT


HEENT: Other (dry mucous membranes)





- Neck


Neck: Supple, no meningeal sign





- Cardiac


Cardiac: RRR, No murmur





- Respiratory


Respiratory: No respiratory distress, Clear bilaterally





- Abdomen


Abdomen: Soft, Non tender, Non distended





- Back


Back: No CVA TTP





- Derm


Derm: Normal color, Warm and dry





- Extremities


Extremities: No edema





- Neuro


Neuro: Alert and oriented X 3, CNs 2-12 intact, Normal speech





Results





- Vitals


Vitals: 


                               Vital Signs - 24 hr











  12/01/19 12/01/19 12/01/19





  05:40 06:27 07:33


 


Temperature 35.0 C L  


 


Heart Rate 102 H 99 94


 


Respiratory 18 20 20





Rate   


 


Blood Pressure 135/75 H 142/79 H 122/94 H


 


O2 Saturation 98 99 96














  12/01/19





  08:00


 


Temperature 36.3 C L


 


Heart Rate 100


 


Respiratory 21





Rate 


 


Blood Pressure 172/104 H


 


O2 Saturation 98








                                     Oxygen











O2 Source                      Room air

















- Labs


Labs: 


                                Laboratory Tests











  12/01/19 12/01/19 12/01/19





  06:35 07:25 07:25


 


WBC   5.8 


 


RBC   5.10 


 


Hgb   14.9 


 


Hct   44.4 


 


MCV   87.1 


 


MCH   29.2 


 


MCHC   33.6 


 


RDW   13.0 


 


Plt Count   281 


 


MPV   12.2 H 


 


Neut # (Auto)   3.9 


 


Lymph # (Auto)   1.4 L 


 


Mono # (Auto)   0.4 


 


Eos # (Auto)   0.0 


 


Baso # (Auto)   0.0 


 


Absolute Nucleated RBC   0.00 


 


Nucleated RBC %   0.0 


 


VBG pH   


 


VBG pCO2   


 


VBG pO2   


 


VBG HCO3   


 


VBG Total CO2   


 


VBG O2 Saturation   


 


VBG Base Excess   


 


Sodium    121 L


 


Potassium    5.9 H


 


Chloride    82 L


 


Carbon Dioxide    15 L


 


Anion Gap    24.0 H


 


BUN    54 H


 


Creatinine    2.0 H


 


Estimated GFR (MDRD)    44 L


 


Glucose    982 H*


 


Glycated Hemoglobin   


 


Estim Average Glucose   


 


Calcium    10.2


 


Total Bilirubin    1.8 H


 


AST    16


 


ALT    42


 


Alkaline Phosphatase    79


 


Total Protein    8.9 H


 


Albumin    4.8


 


Globulin    4.1


 


Albumin/Globulin Ratio    1.2


 


Lipase    56 H


 


Urine Color  YELLOW  


 


Urine Clarity  CLEAR  


 


Urine pH  5.5  


 


Ur Specific Gravity  <=1.005  


 


Urine Protein  NEGATIVE  


 


Urine Glucose (UA)  >=1000 H  


 


Urine Ketones  40 H  


 


Urine Occult Blood  NEGATIVE  


 


Urine Nitrite  NEGATIVE  


 


Urine Bilirubin  NEGATIVE  


 


Urine Urobilinogen  0.2 (NORMAL)  


 


Ur Leukocyte Esterase  NEGATIVE  


 


Ur Microscopic Review  NOT INDICATED  


 


Urine Culture Comments  NOT INDICATED  


 


Serum Ketones    SMALL H














  12/01/19 12/01/19





  07:25 08:00


 


WBC  


 


RBC  


 


Hgb  


 


Hct  


 


MCV  


 


MCH  


 


MCHC  


 


RDW  


 


Plt Count  


 


MPV  


 


Neut # (Auto)  


 


Lymph # (Auto)  


 


Mono # (Auto)  


 


Eos # (Auto)  


 


Baso # (Auto)  


 


Absolute Nucleated RBC  


 


Nucleated RBC %  


 


VBG pH   7.256 L


 


VBG pCO2   32.9 L


 


VBG pO2   42.2


 


VBG HCO3   14.3 L


 


VBG Total CO2   15.3 L


 


VBG O2 Saturation   74.8


 


VBG Base Excess   -11.6 L


 


Sodium  


 


Potassium  


 


Chloride  


 


Carbon Dioxide  


 


Anion Gap  


 


BUN  


 


Creatinine  


 


Estimated GFR (MDRD)  


 


Glucose  


 


Glycated Hemoglobin  12.7 H 


 


Estim Average Glucose  318 H 


 


Calcium  


 


Total Bilirubin  


 


AST  


 


ALT  


 


Alkaline Phosphatase  


 


Total Protein  


 


Albumin  


 


Globulin  


 


Albumin/Globulin Ratio  


 


Lipase  


 


Urine Color  


 


Urine Clarity  


 


Urine pH  


 


Ur Specific Gravity  


 


Urine Protein  


 


Urine Glucose (UA)  


 


Urine Ketones  


 


Urine Occult Blood  


 


Urine Nitrite  


 


Urine Bilirubin  


 


Urine Urobilinogen  


 


Ur Leukocyte Esterase  


 


Ur Microscopic Review  


 


Urine Culture Comments  


 


Serum Ketones  














- Rads (name of study)


  ** chest xray


Radiology: Prelim report reviewed, See rad report





Procedures





- Central Line


Central Line Preparation: Consent Obtained, Ultrasound used, Sterile prep and 

drape


Central line location: Right IJ


Central line type: Triple lumen


Central line aftercare: Chlorhexidine disc placed, Secured, Placement confirmed,

No pneumothorax, No complications, Pt tolerated well





PD MEDICAL DECISION MAKING





- ED course


Complexity details: reviewed results, re-evaluated patient, considered 

differential, d/w patient, d/w family





Departure





- Departure


Disposition: [facility] DC/Xfer


Clinical Impression: 


Diabetic ketoacidosis


Qualifiers:


 Diabetes mellitus type: other specified (including PERLA) Diabetes mellitus 

complication detail: without coma Qualified Code(s): E13.10 - Other specified 

diabetes mellitus with ketoacidosis without coma





Condition: Stable

## 2019-12-01 NOTE — XRAY REPORT
Reason:  central line placement

Procedure Date:  12/01/2019   

Accession Number:  [id number] / [id number]                    

Procedure:  XR  - Chest for Line Placement CPT Code:  

 

***Final Report***

 

 

FULL RESULT:

 

 

EXAM:

CHEST RADIOGRAPHY

 

EXAM DATE: 12/1/2019 07:45 AM.

 

CLINICAL HISTORY: Central line placement.

 

COMPARISON: None.

 

TECHNIQUE: 1 view.

 

FINDINGS:

Support apparatus: Right IJ approach central venous catheter with tip 

over the upper SVC approximately 6.5 cm above the expected location of 

the superior cavoatrial junction.

 

Lungs/Pleura: No focal opacities evident. No pleural effusion. No visible 

pneumothorax.

 

Mediastinum: Within exam limitations, the cardiomediastinal contour is 

normal.

 

Other: None.

 

IMPRESSION:

1. Right IJ approach central venous catheter with tip over the upper SVC 

approximately 6.5 cm above the expected location of the superior cava 

atrial junction. Consider advancing.

2. No acute cardiopulmonary process.

 

RADIA

## 2019-12-02 LAB
ANION GAP SERPL CALCULATED.4IONS-SCNC: 10 MMOL/L (ref 6–13)
ANION GAP SERPL CALCULATED.4IONS-SCNC: 9 MMOL/L (ref 6–13)
BASE EXCESS BLDV CALC-SCNC: -1.5 MMOL/L
BASOPHILS NFR BLD AUTO: 0 10^3/UL (ref 0–0.1)
BASOPHILS NFR BLD AUTO: 0.6 %
BUN SERPL-MCNC: 39 MG/DL (ref 6–20)
BUN SERPL-MCNC: 42 MG/DL (ref 6–20)
CALCIUM UR-MCNC: 9.5 MG/DL (ref 8.5–10.3)
CALCIUM UR-MCNC: 9.5 MG/DL (ref 8.5–10.3)
CHLORIDE SERPL-SCNC: 100 MMOL/L (ref 101–111)
CHLORIDE SERPL-SCNC: 100 MMOL/L (ref 101–111)
CO2 BLDV-SCNC: 25.7 MMOL/L (ref 24–29)
CO2 SERPL-SCNC: 25 MMOL/L (ref 21–32)
CO2 SERPL-SCNC: 25 MMOL/L (ref 21–32)
CREAT SERPLBLD-SCNC: 1.2 MG/DL (ref 0.6–1.2)
CREAT SERPLBLD-SCNC: 1.3 MG/DL (ref 0.6–1.2)
EOSINOPHIL # BLD AUTO: 0.2 10^3/UL (ref 0–0.7)
EOSINOPHIL NFR BLD AUTO: 2.4 %
ERYTHROCYTE [DISTWIDTH] IN BLOOD BY AUTOMATED COUNT: 12.7 % (ref 12–15)
GFRSERPLBLD MDRD-ARVRAT: 72 ML/MIN/{1.73_M2} (ref 89–?)
GFRSERPLBLD MDRD-ARVRAT: 79 ML/MIN/{1.73_M2} (ref 89–?)
GLUCOSE SERPL-MCNC: 132 MG/DL (ref 70–100)
GLUCOSE SERPL-MCNC: 172 MG/DL (ref 70–100)
HGB UR QL STRIP: 12.7 G/DL (ref 14–18)
KETONES SERPL STRIP-SCNC: NEGATIVE MMOL/L
LYMPHOCYTES # SPEC AUTO: 2.6 10^3/UL (ref 1.5–3.5)
LYMPHOCYTES NFR BLD AUTO: 39.8 %
MAGNESIUM SERPL-MCNC: 2.5 MG/DL (ref 1.7–2.8)
MCH RBC QN AUTO: 28 PG (ref 27–31)
MCHC RBC AUTO-ENTMCNC: 33.3 G/DL (ref 32–36)
MCV RBC AUTO: 83.9 FL (ref 80–94)
MONOCYTES # BLD AUTO: 0.7 10^3/UL (ref 0–1)
MONOCYTES NFR BLD AUTO: 10.9 %
NEUTROPHILS # BLD AUTO: 3 10^3/UL (ref 1.5–6.6)
NEUTROPHILS # SNV AUTO: 6.6 X10^3/UL (ref 4.8–10.8)
NEUTROPHILS NFR BLD AUTO: 46 %
PCO2 BLDV: 44.8 MMHG (ref 41–51)
PDW BLD AUTO: 11.5 FL (ref 7.4–11.4)
PH BLDV: 7.35 [PH] (ref 7.31–7.41)
PHOSPHATE BLD-MCNC: 2.8 MG/DL (ref 2.5–4.6)
PLATELET # BLD: 225 10^3/UL (ref 130–450)
PO2 BLDV: 57.2 MMHG (ref 25–47)
RBC MAR: 4.54 10^6/UL (ref 4.7–6.1)
SODIUM SERPLBLD-SCNC: 134 MMOL/L (ref 135–145)
SODIUM SERPLBLD-SCNC: 135 MMOL/L (ref 135–145)

## 2019-12-02 RX ADMIN — LIDOCAINE HYDROCHLORIDE PRN ML: 20 SOLUTION ORAL; TOPICAL at 18:30

## 2019-12-02 RX ADMIN — POTASSIUM CHLORIDE SCH MLS/HR: 7.46 INJECTION, SOLUTION INTRAVENOUS at 03:40

## 2019-12-02 RX ADMIN — INSULIN HUMAN SCH MLS/HR: 100 INJECTION, SOLUTION PARENTERAL at 02:52

## 2019-12-02 RX ADMIN — INSULIN GLARGINE SCH UNIT: 100 INJECTION, SOLUTION SUBCUTANEOUS at 08:31

## 2019-12-02 RX ADMIN — DEXTROSE AND SODIUM CHLORIDE SCH: 5; 450 INJECTION, SOLUTION INTRAVENOUS at 17:13

## 2019-12-02 RX ADMIN — INSULIN ASPART SCH UNIT: 100 INJECTION, SOLUTION INTRAVENOUS; SUBCUTANEOUS at 21:16

## 2019-12-02 RX ADMIN — INSULIN ASPART SCH UNIT: 100 INJECTION, SOLUTION INTRAVENOUS; SUBCUTANEOUS at 11:43

## 2019-12-02 RX ADMIN — SODIUM CHLORIDE SCH: 9 INJECTION, SOLUTION INTRAVENOUS at 01:11

## 2019-12-02 RX ADMIN — POLYETHYLENE GLYCOL 3350 SCH GM: 17 POWDER, FOR SOLUTION ORAL at 11:43

## 2019-12-02 RX ADMIN — INSULIN ASPART SCH: 100 INJECTION, SOLUTION INTRAVENOUS; SUBCUTANEOUS at 17:12

## 2019-12-02 RX ADMIN — INSULIN ASPART SCH UNIT: 100 INJECTION, SOLUTION INTRAVENOUS; SUBCUTANEOUS at 11:44

## 2019-12-02 RX ADMIN — LISINOPRIL SCH MG: 20 TABLET ORAL at 10:02

## 2019-12-02 RX ADMIN — Medication PRN UNIT: at 17:39

## 2019-12-02 RX ADMIN — DEXTROSE AND SODIUM CHLORIDE SCH MLS/HR: 5; 450 INJECTION, SOLUTION INTRAVENOUS at 05:42

## 2019-12-02 RX ADMIN — POTASSIUM CHLORIDE SCH: 7.46 INJECTION, SOLUTION INTRAVENOUS at 04:42

## 2019-12-02 RX ADMIN — POTASSIUM CHLORIDE SCH MLS/HR: 7.46 INJECTION, SOLUTION INTRAVENOUS at 01:18

## 2019-12-02 RX ADMIN — DOCUSATE SODIUM SCH MG: 250 CAPSULE, LIQUID FILLED ORAL at 11:43

## 2019-12-02 RX ADMIN — INSULIN ASPART SCH: 100 INJECTION, SOLUTION INTRAVENOUS; SUBCUTANEOUS at 17:13

## 2019-12-02 RX ADMIN — POTASSIUM CHLORIDE SCH MLS/HR: 7.46 INJECTION, SOLUTION INTRAVENOUS at 05:45

## 2019-12-02 RX ADMIN — SODIUM CHLORIDE, PRESERVATIVE FREE PRN ML: 5 INJECTION INTRAVENOUS at 00:35

## 2019-12-02 RX ADMIN — SODIUM CHLORIDE, PRESERVATIVE FREE SCH ML: 5 INJECTION INTRAVENOUS at 08:36

## 2019-12-02 RX ADMIN — SODIUM CHLORIDE SCH: 9 INJECTION, SOLUTION INTRAVENOUS at 09:24

## 2019-12-02 RX ADMIN — ENOXAPARIN SODIUM SCH MG: 100 INJECTION SUBCUTANEOUS at 08:36

## 2019-12-02 RX ADMIN — SODIUM CHLORIDE, PRESERVATIVE FREE SCH: 5 INJECTION INTRAVENOUS at 17:39

## 2019-12-02 RX ADMIN — SODIUM CHLORIDE SCH: 9 INJECTION, SOLUTION INTRAVENOUS at 17:13

## 2019-12-02 NOTE — PROVIDER PROGRESS NOTE
Subjective





- Prog Note Date


Prog Note Date: 12/02/19


Prog Note Time: 09:30





- Subjective


Subjective: 





he's tired. was sitting upright in chair this morning, asleep as he had his hand

held device on with football . wants to get out of here.





Current Medications





- Current Medications


Current Medications: 








Active Medications





Acetaminophen (Tylenol)  650 mg PO Q4HR PRN


   PRN Reason: Pain 1 to 4


Docusate Sodium (Colace 250mg Capsule)  250 - 500 mg PO DAILY [facility]


   Last Admin: 12/02/19 11:43 Dose:  250 mg


Enoxaparin Sodium (Lovenox)  40 mg SUBQ DAILY [facility]


   Last Admin: 12/02/19 08:36 Dose:  40 mg


Dextrose/Sodium Chloride (D5.45ns)  1,000 mls @ 125 mls/hr IV .Q8H [facility]


   Last Infusion: 12/02/19 11:36 Dose:  0 mls/hr


Sodium Chloride (Normal Saline 0.9%)  1,000 mls @ 125 mls/hr IV .Q8H [facility]


   Last Admin: 12/02/19 09:24 Dose:  Not Given


Insulin Aspart (Novolog)  2 - 10 unit SUBQ 0800,1200,1700,2100 [facility]; Protocol


   Last Admin: 12/02/19 11:44 Dose:  10 unit


Insulin Aspart (Novolog)  5 unit SUBQ TIDWM [facility]; Protocol


   Last Admin: 12/02/19 11:43 Dose:  5 unit


Insulin Glargine (Lantus Solostar)  30 unit SUBQ [id number] [facility]


   Last Admin: 12/02/19 08:31 Dose:  30 unit


Lisinopril (Zestril)  20 mg PO DAILY [facility]


   Last Admin: 12/02/19 10:02 Dose:  20 mg


Ondansetron HCl (Zofran Inj)  4 mg IVP Q6HR PRN


   PRN Reason: Nausea / Vomiting


   Last Admin: 12/01/19 16:21 Dose:  4 mg


Ondansetron HCl (Zofran Odt)  4 mg TL Q6HR PRN


   PRN Reason: Nausea / Vomiting


Oxycodone HCl (Roxicodone)  5 mg PO Q4HR PRN


   PRN Reason: Pain 5 to 7


Polyethylene Glycol (Miralax)  17 gm PO DAILY [facility]


   Last Admin: 12/02/19 11:43 Dose:  17 gm


Sodium Chloride (Normal Saline Flush 0.9%)  10 ml IVP PRN PRN


   PRN Reason: AS NEEDED PER PROVIDER ORDERS


   Last Admin: 12/02/19 00:35 Dose:  10 ml


Sodium Chloride (Normal Saline Flush 0.9%)  10 ml IVP 0100,0900,1700 [facility]


   Last Admin: 12/02/19 08:36 Dose:  10 ml


Sodium Chloride (Normal Saline Flush 0.9%)  20 ml IVP PRN PRN


   PRN Reason: After Blood Draw


   Last Admin: 12/02/19 00:35 Dose:  20 ml


Witch Hazel/Glycerin (Tucks)  1 pad TOP PRN PRN


   PRN Reason: ITCHING





                                        





Losartan/Hydrochlorothiazide [Losartan-Hctz 100-12.5 mg Tab] 1 tab PO DAILY 

12/02/19 


Terbinafine [Lamisil] 250 mg PO DAILY 12/02/19 











Objective





- Vital Signs/Intake & Output


Reviewed Vital Signs: Yes


Vital Signs: 





                                   Vital Signs











  Pulse Resp BP


 


 12/02/19 07:00  82  18  133/75 H


 


 12/02/19 06:00  95  21  136/78 H


 


 12/02/19 05:00  87  18  128/78











Intake & Output: 





                                 Intake & Output











 11/29/19 11/30/19 12/01/19 12/02/19





 23:59 23:59 23:59 23:59


 


Intake Total   3541.993 3149.466


 


Output Total   2475 700


 


Balance   8424.143 9143.466














- Objective


General Appearance: positive: No acute distress, Other (sleepy and tired but 

appropriate)


Eyes Bilateral: positive: PERRL


ENT: positive: Dry mucous membranes


Neck: positive: No JVD.  negative: Stiff neck, Carotid bruit


Respiratory: positive: Chest non-tender.  negative: Wheezes, Rales, Rhonchi


Cardiovascular: positive: Regular rate & rhythm.  negative: Gallop/S4, Friction 

rub


Abdomen: positive: Non-tender, No organomegaly (but he has a large panus and 

difficult to assess), Nml bowel sounds, No distention


Skin: positive: Warm, Dry


Extremities: positive: Full ROM


Neurologic/Psychiatric: positive: Oriented x3, CN's nml (2-12), Motor nml





- Lab Results


Fish Bones: 


                                 12/02/19 05:00





                                 12/02/19 05:00


Other Labs: 





                               Lab Results x24hrs











  12/02/19 12/02/19 12/02/19 Range/Units





  05:00 05:00 05:00 


 


WBC    6.6  (4.8-10.8)  x10^3/uL


 


RBC    4.54 L  (4.70-6.10)  10^6/uL


 


Hgb    12.7 L  (14.0-18.0)  g/dL


 


Hct    38.1 L  (42.0-52.0)  %


 


MCV    83.9  (80.0-94.0)  fL


 


MCH    28.0  (27.0-31.0)  pg


 


MCHC    33.3  (32.0-36.0)  g/dL


 


RDW    12.7  (12.0-15.0)  %


 


Plt Count    225  (130-450)  10^3/uL


 


MPV    11.5 H  (7.4-11.4)  fL


 


Neut # (Auto)    3.0  (1.5-6.6)  10^3/uL


 


Lymph # (Auto)    2.6  (1.5-3.5)  10^3/uL


 


Mono # (Auto)    0.7  (0.0-1.0)  10^3/uL


 


Eos # (Auto)    0.2  (0.0-0.7)  10^3/uL


 


Baso # (Auto)    0.0  (0.0-0.1)  10^3/uL


 


Absolute Nucleated RBC    0.00  x10^3/uL


 


Nucleated RBC %    0.0  /100WBC


 


VBG pH  7.352    (7.31-7.41)  


 


VBG pCO2  44.8    (41-51)  mmHg


 


VBG pO2  57.2 H    (25-47)  mmHg


 


VBG HCO3  24.3    (23-28)  mmol/L


 


VBG Total CO2  25.7    (24-29)  mmol/L


 


VBG O2 Saturation  89.7 H    (60-80)  %


 


VBG Base Excess  -1.5    (-2 - +2)  mmol/L


 


Sodium   134 L   (135-145)  mmol/L


 


Potassium   3.4 L   (3.5-5.0)  mmol/L


 


Chloride   100 L   (101-111)  mmol/L


 


Carbon Dioxide   25   (21-32)  mmol/L


 


Anion Gap   9.0   (6-13)  


 


BUN   39 H   (6-20)  mg/dL


 


Creatinine   1.2   (0.6-1.2)  mg/dL


 


Estimated GFR (MDRD)   79 L   (>89)  


 


Glucose   132 H   ()  mg/dL


 


POC Whole Bld Glucose     (70 - 100)  mg/dL


 


Glycated Hemoglobin     (4.6-6.2)  %


 


Estim Average Glucose     ()  


 


Calcium   9.5   (8.5-10.3)  mg/dL


 


Phosphorus   2.8   (2.5-4.6)  mg/dL


 


Magnesium   2.5   (1.7-2.8)  mg/dL


 


Total Bilirubin     (0.2-1.0)  mg/dL


 


AST     (10-42)  IU/L


 


ALT     (10-60)  IU/L


 


Alkaline Phosphatase     ()  IU/L


 


Total Protein     (6.7-8.2)  g/dL


 


Albumin     (3.2-5.5)  g/dL


 


Globulin     (2.1-4.2)  g/dL


 


Albumin/Globulin Ratio     (1.0-2.2)  


 


Lipase     (22-51)  U/L


 


Nasal Screen MRSA (PCR)     (NEGATIVE)  


 


Serum Ketones   NEGATIVE   (NEGATIVE)  














  12/02/19 12/02/19 12/01/19 Range/Units





  03:15 00:30 21:00 


 


WBC     (4.8-10.8)  x10^3/uL


 


RBC     (4.70-6.10)  10^6/uL


 


Hgb     (14.0-18.0)  g/dL


 


Hct     (42.0-52.0)  %


 


MCV     (80.0-94.0)  fL


 


MCH     (27.0-31.0)  pg


 


MCHC     (32.0-36.0)  g/dL


 


RDW     (12.0-15.0)  %


 


Plt Count     (130-450)  10^3/uL


 


MPV     (7.4-11.4)  fL


 


Neut # (Auto)     (1.5-6.6)  10^3/uL


 


Lymph # (Auto)     (1.5-3.5)  10^3/uL


 


Mono # (Auto)     (0.0-1.0)  10^3/uL


 


Eos # (Auto)     (0.0-0.7)  10^3/uL


 


Baso # (Auto)     (0.0-0.1)  10^3/uL


 


Absolute Nucleated RBC     x10^3/uL


 


Nucleated RBC %     /100WBC


 


VBG pH     (7.31-7.41)  


 


VBG pCO2     (41-51)  mmHg


 


VBG pO2     (25-47)  mmHg


 


VBG HCO3     (23-28)  mmol/L


 


VBG Total CO2     (24-29)  mmol/L


 


VBG O2 Saturation     (60-80)  %


 


VBG Base Excess     (-2 - +2)  mmol/L


 


Sodium   135   (135-145)  mmol/L


 


Potassium  3.6  3.7   (3.5-5.0)  mmol/L


 


Chloride   100 L   (101-111)  mmol/L


 


Carbon Dioxide   25   (21-32)  mmol/L


 


Anion Gap   10.0   (6-13)  


 


BUN   42 H   (6-20)  mg/dL


 


Creatinine   1.3 H   (0.6-1.2)  mg/dL


 


Estimated GFR (MDRD)   72 L   (>89)  


 


Glucose   172 H   ()  mg/dL


 


POC Whole Bld Glucose     (70 - 100)  mg/dL


 


Glycated Hemoglobin     (4.6-6.2)  %


 


Estim Average Glucose     ()  


 


Calcium   9.5   (8.5-10.3)  mg/dL


 


Phosphorus     (2.5-4.6)  mg/dL


 


Magnesium    3.1 H  (1.7-2.8)  mg/dL


 


Total Bilirubin     (0.2-1.0)  mg/dL


 


AST     (10-42)  IU/L


 


ALT     (10-60)  IU/L


 


Alkaline Phosphatase     ()  IU/L


 


Total Protein     (6.7-8.2)  g/dL


 


Albumin     (3.2-5.5)  g/dL


 


Globulin     (2.1-4.2)  g/dL


 


Albumin/Globulin Ratio     (1.0-2.2)  


 


Lipase     (22-51)  U/L


 


Nasal Screen MRSA (PCR)     (NEGATIVE)  


 


Serum Ketones     (NEGATIVE)  














  12/01/19 12/01/19 12/01/19 Range/Units





  21:00 18:39 16:51 


 


WBC     (4.8-10.8)  x10^3/uL


 


RBC     (4.70-6.10)  10^6/uL


 


Hgb     (14.0-18.0)  g/dL


 


Hct     (42.0-52.0)  %


 


MCV     (80.0-94.0)  fL


 


MCH     (27.0-31.0)  pg


 


MCHC     (32.0-36.0)  g/dL


 


RDW     (12.0-15.0)  %


 


Plt Count     (130-450)  10^3/uL


 


MPV     (7.4-11.4)  fL


 


Neut # (Auto)     (1.5-6.6)  10^3/uL


 


Lymph # (Auto)     (1.5-3.5)  10^3/uL


 


Mono # (Auto)     (0.0-1.0)  10^3/uL


 


Eos # (Auto)     (0.0-0.7)  10^3/uL


 


Baso # (Auto)     (0.0-0.1)  10^3/uL


 


Absolute Nucleated RBC     x10^3/uL


 


Nucleated RBC %     /100WBC


 


VBG pH     (7.31-7.41)  


 


VBG pCO2     (41-51)  mmHg


 


VBG pO2     (25-47)  mmHg


 


VBG HCO3     (23-28)  mmol/L


 


VBG Total CO2     (24-29)  mmol/L


 


VBG O2 Saturation     (60-80)  %


 


VBG Base Excess     (-2 - +2)  mmol/L


 


Sodium  133 L  132 L   (135-145)  mmol/L


 


Potassium  3.6  4.0   (3.5-5.0)  mmol/L


 


Chloride  97 L  96 L   (101-111)  mmol/L


 


Carbon Dioxide  24  23   (21-32)  mmol/L


 


Anion Gap  12.0  13.0   (6-13)  


 


BUN  45 H  49 H   (6-20)  mg/dL


 


Creatinine  1.4 H  1.7 H   (0.6-1.2)  mg/dL


 


Estimated GFR (MDRD)  66 L  53 L   (>89)  


 


Glucose  185 H  281 H   ()  mg/dL


 


POC Whole Bld Glucose    416 H  (70 - 100)  mg/dL


 


Glycated Hemoglobin     (4.6-6.2)  %


 


Estim Average Glucose     ()  


 


Calcium  9.7  9.7   (8.5-10.3)  mg/dL


 


Phosphorus     (2.5-4.6)  mg/dL


 


Magnesium     (1.7-2.8)  mg/dL


 


Total Bilirubin     (0.2-1.0)  mg/dL


 


AST     (10-42)  IU/L


 


ALT     (10-60)  IU/L


 


Alkaline Phosphatase     ()  IU/L


 


Total Protein     (6.7-8.2)  g/dL


 


Albumin     (3.2-5.5)  g/dL


 


Globulin     (2.1-4.2)  g/dL


 


Albumin/Globulin Ratio     (1.0-2.2)  


 


Lipase     (22-51)  U/L


 


Nasal Screen MRSA (PCR)     (NEGATIVE)  


 


Serum Ketones     (NEGATIVE)  














  12/01/19 12/01/19 12/01/19 Range/Units





  16:40 16:10 14:38 


 


WBC     (4.8-10.8)  x10^3/uL


 


RBC     (4.70-6.10)  10^6/uL


 


Hgb     (14.0-18.0)  g/dL


 


Hct     (42.0-52.0)  %


 


MCV     (80.0-94.0)  fL


 


MCH     (27.0-31.0)  pg


 


MCHC     (32.0-36.0)  g/dL


 


RDW     (12.0-15.0)  %


 


Plt Count     (130-450)  10^3/uL


 


MPV     (7.4-11.4)  fL


 


Neut # (Auto)     (1.5-6.6)  10^3/uL


 


Lymph # (Auto)     (1.5-3.5)  10^3/uL


 


Mono # (Auto)     (0.0-1.0)  10^3/uL


 


Eos # (Auto)     (0.0-0.7)  10^3/uL


 


Baso # (Auto)     (0.0-0.1)  10^3/uL


 


Absolute Nucleated RBC     x10^3/uL


 


Nucleated RBC %     /100WBC


 


VBG pH     (7.31-7.41)  


 


VBG pCO2     (41-51)  mmHg


 


VBG pO2     (25-47)  mmHg


 


VBG HCO3     (23-28)  mmol/L


 


VBG Total CO2     (24-29)  mmol/L


 


VBG O2 Saturation     (60-80)  %


 


VBG Base Excess     (-2 - +2)  mmol/L


 


Sodium  130 L   129 L  (135-145)  mmol/L


 


Potassium  4.0   4.3  (3.5-5.0)  mmol/L


 


Chloride  93 L   91 L  (101-111)  mmol/L


 


Carbon Dioxide  22   19 L  (21-32)  mmol/L


 


Anion Gap  15.0 H   19.0 H  (6-13)  


 


BUN  53 H   51 H  (6-20)  mg/dL


 


Creatinine  1.8 H   1.9 H  (0.6-1.2)  mg/dL


 


Estimated GFR (MDRD)  50 L   47 L  (>89)  


 


Glucose  404 H   537 H*  ()  mg/dL


 


POC Whole Bld Glucose   447 H   (70 - 100)  mg/dL


 


Glycated Hemoglobin     (4.6-6.2)  %


 


Estim Average Glucose     ()  


 


Calcium  9.7   9.8  (8.5-10.3)  mg/dL


 


Phosphorus     (2.5-4.6)  mg/dL


 


Magnesium     (1.7-2.8)  mg/dL


 


Total Bilirubin     (0.2-1.0)  mg/dL


 


AST     (10-42)  IU/L


 


ALT     (10-60)  IU/L


 


Alkaline Phosphatase     ()  IU/L


 


Total Protein     (6.7-8.2)  g/dL


 


Albumin     (3.2-5.5)  g/dL


 


Globulin     (2.1-4.2)  g/dL


 


Albumin/Globulin Ratio     (1.0-2.2)  


 


Lipase     (22-51)  U/L


 


Nasal Screen MRSA (PCR)     (NEGATIVE)  


 


Serum Ketones     (NEGATIVE)  














  12/01/19 12/01/19 12/01/19 Range/Units





  14:36 13:35 12:35 


 


WBC     (4.8-10.8)  x10^3/uL


 


RBC     (4.70-6.10)  10^6/uL


 


Hgb     (14.0-18.0)  g/dL


 


Hct     (42.0-52.0)  %


 


MCV     (80.0-94.0)  fL


 


MCH     (27.0-31.0)  pg


 


MCHC     (32.0-36.0)  g/dL


 


RDW     (12.0-15.0)  %


 


Plt Count     (130-450)  10^3/uL


 


MPV     (7.4-11.4)  fL


 


Neut # (Auto)     (1.5-6.6)  10^3/uL


 


Lymph # (Auto)     (1.5-3.5)  10^3/uL


 


Mono # (Auto)     (0.0-1.0)  10^3/uL


 


Eos # (Auto)     (0.0-0.7)  10^3/uL


 


Baso # (Auto)     (0.0-0.1)  10^3/uL


 


Absolute Nucleated RBC     x10^3/uL


 


Nucleated RBC %     /100WBC


 


VBG pH     (7.31-7.41)  


 


VBG pCO2     (41-51)  mmHg


 


VBG pO2     (25-47)  mmHg


 


VBG HCO3     (23-28)  mmol/L


 


VBG Total CO2     (24-29)  mmol/L


 


VBG O2 Saturation     (60-80)  %


 


VBG Base Excess     (-2 - +2)  mmol/L


 


Sodium     (135-145)  mmol/L


 


Potassium     (3.5-5.0)  mmol/L


 


Chloride     (101-111)  mmol/L


 


Carbon Dioxide     (21-32)  mmol/L


 


Anion Gap     (6-13)  


 


BUN     (6-20)  mg/dL


 


Creatinine     (0.6-1.2)  mg/dL


 


Estimated GFR (MDRD)     (>89)  


 


Glucose   566 H*  548 H*  ()  mg/dL


 


POC Whole Bld Glucose     (70 - 100)  mg/dL


 


Glycated Hemoglobin     (4.6-6.2)  %


 


Estim Average Glucose     ()  


 


Calcium     (8.5-10.3)  mg/dL


 


Phosphorus     (2.5-4.6)  mg/dL


 


Magnesium     (1.7-2.8)  mg/dL


 


Total Bilirubin     (0.2-1.0)  mg/dL


 


AST     (10-42)  IU/L


 


ALT     (10-60)  IU/L


 


Alkaline Phosphatase     ()  IU/L


 


Total Protein     (6.7-8.2)  g/dL


 


Albumin     (3.2-5.5)  g/dL


 


Globulin     (2.1-4.2)  g/dL


 


Albumin/Globulin Ratio     (1.0-2.2)  


 


Lipase     (22-51)  U/L


 


Nasal Screen MRSA (PCR)  NEGATIVE    (NEGATIVE)  


 


Serum Ketones     (NEGATIVE)  














  12/01/19 12/01/19 12/01/19 Range/Units





  11:35 11:35 10:48 


 


WBC     (4.8-10.8)  x10^3/uL


 


RBC     (4.70-6.10)  10^6/uL


 


Hgb     (14.0-18.0)  g/dL


 


Hct     (42.0-52.0)  %


 


MCV     (80.0-94.0)  fL


 


MCH     (27.0-31.0)  pg


 


MCHC     (32.0-36.0)  g/dL


 


RDW     (12.0-15.0)  %


 


Plt Count     (130-450)  10^3/uL


 


MPV     (7.4-11.4)  fL


 


Neut # (Auto)     (1.5-6.6)  10^3/uL


 


Lymph # (Auto)     (1.5-3.5)  10^3/uL


 


Mono # (Auto)     (0.0-1.0)  10^3/uL


 


Eos # (Auto)     (0.0-0.7)  10^3/uL


 


Baso # (Auto)     (0.0-0.1)  10^3/uL


 


Absolute Nucleated RBC     x10^3/uL


 


Nucleated RBC %     /100WBC


 


VBG pH  7.272 L    (7.31-7.41)  


 


VBG pCO2  35.1 L    (41-51)  mmHg


 


VBG pO2  35.2    (25-47)  mmHg


 


VBG HCO3  15.8 L    (23-28)  mmol/L


 


VBG Total CO2  16.9 L    (24-29)  mmol/L


 


VBG O2 Saturation  65.6    (60-80)  %


 


VBG Base Excess  -10.0 L    (-2 - +2)  mmol/L


 


Sodium   130 L   (135-145)  mmol/L


 


Potassium   4.5   (3.5-5.0)  mmol/L


 


Chloride   91 L   (101-111)  mmol/L


 


Carbon Dioxide   17 L   (21-32)  mmol/L


 


Anion Gap   22.0 H   (6-13)  


 


BUN   51 H   (6-20)  mg/dL


 


Creatinine   1.9 H   (0.6-1.2)  mg/dL


 


Estimated GFR (MDRD)   47 L   (>89)  


 


Glucose   704 H*  724 H*  ()  mg/dL


 


POC Whole Bld Glucose     (70 - 100)  mg/dL


 


Glycated Hemoglobin     (4.6-6.2)  %


 


Estim Average Glucose     ()  


 


Calcium   10.3   (8.5-10.3)  mg/dL


 


Phosphorus     (2.5-4.6)  mg/dL


 


Magnesium   3.4 H   (1.7-2.8)  mg/dL


 


Total Bilirubin     (0.2-1.0)  mg/dL


 


AST     (10-42)  IU/L


 


ALT     (10-60)  IU/L


 


Alkaline Phosphatase     ()  IU/L


 


Total Protein     (6.7-8.2)  g/dL


 


Albumin     (3.2-5.5)  g/dL


 


Globulin     (2.1-4.2)  g/dL


 


Albumin/Globulin Ratio     (1.0-2.2)  


 


Lipase     (22-51)  U/L


 


Nasal Screen MRSA (PCR)     (NEGATIVE)  


 


Serum Ketones     (NEGATIVE)  














  12/01/19 12/01/19 12/01/19 Range/Units





  09:40 08:36 07:25 


 


WBC     (4.8-10.8)  x10^3/uL


 


RBC     (4.70-6.10)  10^6/uL


 


Hgb     (14.0-18.0)  g/dL


 


Hct     (42.0-52.0)  %


 


MCV     (80.0-94.0)  fL


 


MCH     (27.0-31.0)  pg


 


MCHC     (32.0-36.0)  g/dL


 


RDW     (12.0-15.0)  %


 


Plt Count     (130-450)  10^3/uL


 


MPV     (7.4-11.4)  fL


 


Neut # (Auto)     (1.5-6.6)  10^3/uL


 


Lymph # (Auto)     (1.5-3.5)  10^3/uL


 


Mono # (Auto)     (0.0-1.0)  10^3/uL


 


Eos # (Auto)     (0.0-0.7)  10^3/uL


 


Baso # (Auto)     (0.0-0.1)  10^3/uL


 


Absolute Nucleated RBC     x10^3/uL


 


Nucleated RBC %     /100WBC


 


VBG pH     (7.31-7.41)  


 


VBG pCO2     (41-51)  mmHg


 


VBG pO2     (25-47)  mmHg


 


VBG HCO3     (23-28)  mmol/L


 


VBG Total CO2     (24-29)  mmol/L


 


VBG O2 Saturation     (60-80)  %


 


VBG Base Excess     (-2 - +2)  mmol/L


 


Sodium  125 L  125 L   (135-145)  mmol/L


 


Potassium  5.1 H  6.0 H*   (3.5-5.0)  mmol/L


 


Chloride  87 L  85 L   (101-111)  mmol/L


 


Carbon Dioxide  15 L  15 L   (21-32)  mmol/L


 


Anion Gap  23.0 H  25.0 H   (6-13)  


 


BUN  52 H  52 H   (6-20)  mg/dL


 


Creatinine  2.0 H  2.0 H   (0.6-1.2)  mg/dL


 


Estimated GFR (MDRD)  44 L  44 L   (>89)  


 


Glucose  828 H*  931 H*   ()  mg/dL


 


POC Whole Bld Glucose     (70 - 100)  mg/dL


 


Glycated Hemoglobin    12.7 H  (4.6-6.2)  %


 


Estim Average Glucose    318 H  ()  


 


Calcium  10.0  10.5 H   (8.5-10.3)  mg/dL


 


Phosphorus     (2.5-4.6)  mg/dL


 


Magnesium  3.5 H  3.1 H   (1.7-2.8)  mg/dL


 


Total Bilirubin     (0.2-1.0)  mg/dL


 


AST     (10-42)  IU/L


 


ALT     (10-60)  IU/L


 


Alkaline Phosphatase     ()  IU/L


 


Total Protein     (6.7-8.2)  g/dL


 


Albumin     (3.2-5.5)  g/dL


 


Globulin     (2.1-4.2)  g/dL


 


Albumin/Globulin Ratio     (1.0-2.2)  


 


Lipase     (22-51)  U/L


 


Nasal Screen MRSA (PCR)     (NEGATIVE)  


 


Serum Ketones     (NEGATIVE)  














  12/01/19 Range/Units





  07:25 


 


WBC   (4.8-10.8)  x10^3/uL


 


RBC   (4.70-6.10)  10^6/uL


 


Hgb   (14.0-18.0)  g/dL


 


Hct   (42.0-52.0)  %


 


MCV   (80.0-94.0)  fL


 


MCH   (27.0-31.0)  pg


 


MCHC   (32.0-36.0)  g/dL


 


RDW   (12.0-15.0)  %


 


Plt Count   (130-450)  10^3/uL


 


MPV   (7.4-11.4)  fL


 


Neut # (Auto)   (1.5-6.6)  10^3/uL


 


Lymph # (Auto)   (1.5-3.5)  10^3/uL


 


Mono # (Auto)   (0.0-1.0)  10^3/uL


 


Eos # (Auto)   (0.0-0.7)  10^3/uL


 


Baso # (Auto)   (0.0-0.1)  10^3/uL


 


Absolute Nucleated RBC   x10^3/uL


 


Nucleated RBC %   /100WBC


 


VBG pH   (7.31-7.41)  


 


VBG pCO2   (41-51)  mmHg


 


VBG pO2   (25-47)  mmHg


 


VBG HCO3   (23-28)  mmol/L


 


VBG Total CO2   (24-29)  mmol/L


 


VBG O2 Saturation   (60-80)  %


 


VBG Base Excess   (-2 - +2)  mmol/L


 


Sodium  121 L  (135-145)  mmol/L


 


Potassium  5.9 H  (3.5-5.0)  mmol/L


 


Chloride  82 L  (101-111)  mmol/L


 


Carbon Dioxide  15 L  (21-32)  mmol/L


 


Anion Gap  24.0 H  (6-13)  


 


BUN  54 H  (6-20)  mg/dL


 


Creatinine  2.0 H  (0.6-1.2)  mg/dL


 


Estimated GFR (MDRD)  44 L  (>89)  


 


Glucose  982 H*  ()  mg/dL


 


POC Whole Bld Glucose   (70 - 100)  mg/dL


 


Glycated Hemoglobin   (4.6-6.2)  %


 


Estim Average Glucose   ()  


 


Calcium  10.2  (8.5-10.3)  mg/dL


 


Phosphorus   (2.5-4.6)  mg/dL


 


Magnesium   (1.7-2.8)  mg/dL


 


Total Bilirubin  1.8 H  (0.2-1.0)  mg/dL


 


AST  16  (10-42)  IU/L


 


ALT  42  (10-60)  IU/L


 


Alkaline Phosphatase  79  ()  IU/L


 


Total Protein  8.9 H  (6.7-8.2)  g/dL


 


Albumin  4.8  (3.2-5.5)  g/dL


 


Globulin  4.1  (2.1-4.2)  g/dL


 


Albumin/Globulin Ratio  1.2  (1.0-2.2)  


 


Lipase  56 H  (22-51)  U/L


 


Nasal Screen MRSA (PCR)   (NEGATIVE)  


 


Serum Ketones   (NEGATIVE)  














Assessment/Plan





- Problem List


(1) Diabetic ketoacidosis


Impression: 


Has almost resolved as of this am. Presented for blurred vision and weakness 

along with progressively worsening polydipsia and polyuria. Blood glucose on 

arrival was 982 and he is down to 132 this morning.  His VBG resulted as 

follows: pH: 7.27, pCO2: 35.1, HCO3: 15.8. Was started on treatment for DKA.


This morning his pH 7.35, PCO2 44.8, HCO3 24.3, base excess -1.5 


Checking CMP Q4h: anion gap 24 > 22 > 19>13 by 18:39 last night and 9 this am. 


Hyperkalemia to 6 and is 3.4 this am.


An EKG was checked because of hyperkalemia and showed sinus tach, R axis 

deviation and borderline ST elevation. His corrected sodium is now 136 and K is 

WNL . On  DKA protocol and his drip is 12-13 units/hour right now





-start lantus 0.2 mg/kg now


-stop drip one hour later


-add 5 units of short acting before meals


-add SS moderate scale of short acting before meals


-start low carb diet


-continue to monitor electrolyte and supplement  


Qualifiers: 


   Diabetes mellitus type: type 2   Diabetes mellitus complication detail: 

without coma   Qualified Code(s): E11.10 - Type 2 diabetes mellitus with 

ketoacidosis without coma   





(2) Newly diagnosed diabetes


Conclusion/Plan: 


AIC on admit was 12.7%. His mother has DM II as well. His wife was hoping that 

he would be able to eventually take pills to control his sugar, but given his 

obesity along with such a high A1C, he is unfortunately not a candidate. His 

wife feels overwhelmed about the new diagnosis, but the pt doesn't seemed to be 

phased by this. Reassured both the patient and the wife that they will receive 

diabetic education before discharging. 





-Transitioned to short- and long- term injections once stable and off gtt 


-Diabetic education 





(3) Acute kidney injury


Conclusion/Plan: 


Secondary to dehydration and DKA. BUN/creatinine: 54/2 > 51/1.9 > 5.1/1.9>39/1.2

this am.


 Receiving aggressive hydration. 


-Continue IVF 


-BMP BID





(4) Hypertension


Conclusion/Plan: 


Takes Lisinopril/HCTZ at home. Holding now due to BRITTA 


-Will resume lisinopril without HCTZ for now.  


Qualifiers: 


   Hypertension type: essential hypertension   Qualified Code(s): I10 - 

Essential (primary) hypertension   





(5) Hyperlipidemia


Conclusion/Plan: 


Was taking Atorvastatin up until a few weeks ago. He stopped because it was 

making his skin dry. His ASCVD risk is now especially high with his new dx of 

DM. Will check lipid panel and have him f/u w/ PCP after D/C 


-Check lipid panel when sugar is controlled


Qualifiers: 


   Hyperlipidemia type: unspecified   Qualified Code(s): E78.5 - Hyperlipidemia,

unspecified   





(6) Personal history of osteoarthritis


Conclusion/Plan: 


Takes Indomethacin at home. Holding for now. 


-Continue PRN Oxy and APAP

## 2019-12-03 LAB
ALBUMIN DIAFP-MCNC: 3.4 G/DL (ref 3.2–5.5)
ANION GAP SERPL CALCULATED.4IONS-SCNC: 9 MMOL/L (ref 6–13)
BASE EXCESS BLDV CALC-SCNC: -1.6 MMOL/L
BUN SERPL-MCNC: 30 MG/DL (ref 6–20)
CALCIUM UR-MCNC: 9 MG/DL (ref 8.5–10.3)
CHLORIDE SERPL-SCNC: 97 MMOL/L (ref 101–111)
CO2 BLDV-SCNC: 25.4 MMOL/L (ref 24–29)
CO2 SERPL-SCNC: 24 MMOL/L (ref 21–32)
CREAT SERPLBLD-SCNC: 1.1 MG/DL (ref 0.6–1.2)
GFRSERPLBLD MDRD-ARVRAT: 88 ML/MIN/{1.73_M2} (ref 89–?)
GLUCOSE SERPL-MCNC: 339 MG/DL (ref 70–100)
MAGNESIUM SERPL-MCNC: 2.2 MG/DL (ref 1.7–2.8)
PCO2 BLDV: 43.8 MMHG (ref 41–51)
PH BLDV: 7.36 [PH] (ref 7.31–7.41)
PHOSPHATE BLD-MCNC: 3.4 MG/DL (ref 2.5–4.6)
PO2 BLDV: 65.3 MMHG (ref 25–47)
SODIUM SERPLBLD-SCNC: 130 MMOL/L (ref 135–145)

## 2019-12-03 RX ADMIN — INSULIN ASPART SCH UNIT: 100 INJECTION, SOLUTION INTRAVENOUS; SUBCUTANEOUS at 08:33

## 2019-12-03 RX ADMIN — LISINOPRIL SCH MG: 20 TABLET ORAL at 08:47

## 2019-12-03 RX ADMIN — SODIUM CHLORIDE, PRESERVATIVE FREE PRN ML: 5 INJECTION INTRAVENOUS at 05:10

## 2019-12-03 RX ADMIN — DOCUSATE SODIUM SCH MG: 250 CAPSULE, LIQUID FILLED ORAL at 08:48

## 2019-12-03 RX ADMIN — SODIUM CHLORIDE SCH: 9 INJECTION, SOLUTION INTRAVENOUS at 02:25

## 2019-12-03 RX ADMIN — Medication PRN UNIT: at 05:10

## 2019-12-03 RX ADMIN — DEXTROSE AND SODIUM CHLORIDE SCH: 5; 450 INJECTION, SOLUTION INTRAVENOUS at 02:25

## 2019-12-03 RX ADMIN — INSULIN GLARGINE SCH UNIT: 100 INJECTION, SOLUTION SUBCUTANEOUS at 08:33

## 2019-12-03 RX ADMIN — INSULIN ASPART SCH UNIT: 100 INJECTION, SOLUTION INTRAVENOUS; SUBCUTANEOUS at 17:08

## 2019-12-03 RX ADMIN — INSULIN ASPART SCH UNIT: 100 INJECTION, SOLUTION INTRAVENOUS; SUBCUTANEOUS at 08:34

## 2019-12-03 RX ADMIN — DEXTROSE AND SODIUM CHLORIDE SCH: 5; 450 INJECTION, SOLUTION INTRAVENOUS at 09:51

## 2019-12-03 RX ADMIN — INSULIN ASPART SCH UNIT: 100 INJECTION, SOLUTION INTRAVENOUS; SUBCUTANEOUS at 17:12

## 2019-12-03 RX ADMIN — SODIUM CHLORIDE, PRESERVATIVE FREE SCH ML: 5 INJECTION INTRAVENOUS at 19:09

## 2019-12-03 RX ADMIN — INSULIN ASPART SCH UNIT: 100 INJECTION, SOLUTION INTRAVENOUS; SUBCUTANEOUS at 12:36

## 2019-12-03 RX ADMIN — SODIUM CHLORIDE, PRESERVATIVE FREE SCH: 5 INJECTION INTRAVENOUS at 02:25

## 2019-12-03 RX ADMIN — SODIUM CHLORIDE, PRESERVATIVE FREE SCH: 5 INJECTION INTRAVENOUS at 12:26

## 2019-12-03 RX ADMIN — POLYETHYLENE GLYCOL 3350 SCH GM: 17 POWDER, FOR SOLUTION ORAL at 08:44

## 2019-12-03 RX ADMIN — SODIUM CHLORIDE SCH: 9 INJECTION, SOLUTION INTRAVENOUS at 09:51

## 2019-12-03 RX ADMIN — INSULIN ASPART SCH UNIT: 100 INJECTION, SOLUTION INTRAVENOUS; SUBCUTANEOUS at 21:12

## 2019-12-03 RX ADMIN — ENOXAPARIN SODIUM SCH MG: 100 INJECTION SUBCUTANEOUS at 09:47

## 2019-12-03 RX ADMIN — LIDOCAINE HYDROCHLORIDE PRN ML: 20 SOLUTION ORAL; TOPICAL at 14:47

## 2019-12-03 RX ADMIN — INSULIN ASPART SCH UNIT: 100 INJECTION, SOLUTION INTRAVENOUS; SUBCUTANEOUS at 12:35

## 2019-12-03 RX ADMIN — LIDOCAINE HYDROCHLORIDE PRN ML: 20 SOLUTION ORAL; TOPICAL at 09:53

## 2019-12-03 NOTE — DISCHARGE PLAN
Discharge Plan


Problem Reviewed?: Yes


Disposition: 01 Home, Self Care


Condition: Stable


Prescriptions: 


Blood Sugar Diagnostic [Freestyle Lite Test Strip] [facility]


Blood-Glucose Meter [Glucometer] 1 each [facility] ACHS #1 each


Insulin Aspart [NovoLOG] 7 unit SUBQ TIDWM #2 pen


Insulin Aspart [NovoLOG] 3 - 11 unit SUBQ 0800,1200,1700,2100 #2 pen


Insulin Glargine [Lantus Solostar] 25 unit SUBQ BID #4 pen


Lancets [facility] each


Losartan [Cozaar] 50 mg PO DAILY #30 tablet


Pen Needle, Diabetic [Insulin Pen Needle] 1 each [facility] dis.needle


Diet: Diabetic


Activity Restrictions: Activity as Tolerated


Shower Restrictions: No


Driving Restrictions: No


Instruction Topics:  Log Blood Sugar, Hyperglycemia, Hypoglycemia, Blood Sugar 

Check, Insulin Injected, Insulin Types, Diabetes Carbs, Diabetes Sick Day Plan, 

Diabetes Activity Tips, Diabetes Low Blood Sugar Ch, Injection Pens Dc


Health Concerns: 


You were admitted with altered mental status which was found to be from 

dehydration causing kidney injury, in Diabetic Ketoacidosis, and you have newly 

diagnosed Diabetes. 





Plan of Treatment: 


You were in the ICU to treat DKA, get aggressive iv fluids, and start new 

Diabetic management. Training you for managing was started and new prescriptions

ordered.


Stop using the blood pressure medication called Losartan-HCTZ, because it was 

dehydrating you. A new prescription for Losartan (without HCTZ) was ordered.


New prescriptions for ALL your medications have been electronically sent to the 

[facility] pharmacy. There are also a back-up set of paper prescriptions for you, in 

case you want to take any of them to a non-[facility] pharmacy.


Call your Primary Care Provider with any furter questions and for all refills.





Care Goals: 


Diabetic management and follow-up.





Assessment: 


The patient and wife understand.





Additional Instructions or Follow Up instructions: 


See your PCP and Diabetic nurse at the [facility] clinic as scheduled. Further 

questions and prescription refill requests will be handled by them. You are also

welcome to also come to the Diabetic Clinic here.





Follow-Up Care: [facility] - Diabetes Ed


No Smoking: If you smoke, Please STOP!  Call [phone] for help.

## 2019-12-03 NOTE — PROVIDER PROGRESS NOTE
Assessment/Plan





- Problem List


(1) Newly diagnosed diabetes


Assessment/Plan: 


His requirements for Lantus and mealtime Insulin and ss Insulin are increasing.


Glu still in 300's.


Will transfer out of ICU


Awaiting DM teaching. He was offered to be followed in that [facility] DM clinic, but 

says he has an appointment this Fri with the [facility] clinic DM nurse.


He will need new supplies and prescriptions at [facility].


I discussed all the above and gave the patient and wife a chart of Moder-High 

Correction Insulin that he will be discharged on, to start to review.


Poss Dch tomorrow








(2) Hypertension


Assessment/Plan: 


His BP meds are being resumed, as iv fluids off








(3) Hyperlipidemia


Assessment/Plan: 


Cholesterol meds resumed








(4) Diabetic ketoacidosis


Assessment/Plan: 


Resolved. Off Insulin drip








(5) Acute kidney injury


Assessment/Plan: 


Resolved








- Current Meds


Current Meds: 





                               Current Medications











Generic Name Dose Route Start Last Admin





  Trade Name Freq  PRN Reason Stop Dose Admin


 


Acetaminophen  650 mg  12/01/19 08:24  12/03/19 08:46





  Tylenol  PO   650 mg





  Q4HR PRN   Administration





  Pain 1 to 4   





     





     





     


 


Docusate Sodium  250 - 500 mg  12/02/19 11:00  12/03/19 08:48





  Colace 250mg Capsule  PO   250 mg





  DAILY BAILEY   Administration





     





     





     





     


 


Enoxaparin Sodium  40 mg  12/01/19 12:00  12/03/19 09:47





  Lovenox  SUBQ   40 mg





  DAILY BAILEY   Administration





     





     





     





     


 


Heparin Sodium (Beef Lung)  30 - 50 unit  12/02/19 16:48  12/03/19 05:10





    IVP   50 unit





  PRN PRN   Administration





  Central Line Protocol (<24 hr)   





     





     





     


 


Dextrose/Sodium Chloride  1,000 mls @ 125 mls/hr  12/01/19 09:00  12/03/19 09:51





  D5.45ns  IV   Not Given





  .Q8H BAILEY   





     





     





     





     


 


Sodium Chloride  1,000 mls @ 125 mls/hr  12/01/19 09:00  12/03/19 09:51





  Normal Saline 0.9%  IV   Not Given





  .Q8H BAILEY   





     





     





     





     


 


Insulin Aspart  5 unit  12/02/19 12:00  12/03/19 12:35





  Novolog  SUBQ   5 unit





  TIDWM BAILEY   Administration





     





     





  Protocol   





     


 


Insulin Aspart  3 - 11 unit  12/02/19 21:00  12/03/19 12:36





  Novolog  SUBQ   11 unit





  0800,1200,1700,2100 BAILEY   Administration





     





     





  Protocol   





     


 


Insulin Glargine  30 unit  12/02/19 08:09  12/03/19 08:33





  Lantus Solostar  SUBQ   30 unit





  QDBREAKFAST BAILEY   Administration





     





     





     





     


 


Insulin Glargine  20 unit  12/02/19 21:00  12/02/19 21:17





  Lantus Solostar  SUBQ   20 unit





  QPM BAILEY   Administration





     





     





     





     


 


Lidocaine HCl  5 ml  12/02/19 16:50  12/03/19 09:53





  Xylocaine Viscous 2%  MM   5 ml





  Q4H PRN   Administration





  Hemorrhoids   





     





     





     


 


Lisinopril  20 mg  12/02/19 09:00  12/03/19 08:47





  Zestril  PO   20 mg





  DAILY BAILEY   Administration





     





     





     





     


 


Ondansetron HCl  4 mg  12/01/19 08:24  12/01/19 16:21





  Zofran Inj  IVP   4 mg





  Q6HR PRN   Administration





  Nausea / Vomiting   





     





     





     


 


Oxycodone HCl  5 mg  12/01/19 08:24  12/03/19 08:47





  Roxicodone  PO   5 mg





  Q4HR PRN   Administration





  Pain 5 to 7   





     





     





     


 


Polyethylene Glycol  17 gm  12/02/19 10:00  12/03/19 08:44





  Miralax  PO   17 gm





  DAILY BAILEY   Administration





     





     





     





     


 


Sodium Chloride  10 ml  12/01/19 08:24  12/02/19 00:35





  Normal Saline Flush 0.9%  IVP   10 ml





  PRN PRN   Administration





  AS NEEDED PER PROVIDER ORDERS   





     





     





     


 


Sodium Chloride  10 ml  12/01/19 17:00  12/03/19 12:26





  Normal Saline Flush 0.9%  IVP   Not Given





  0100,0900,1700 [facility]   





     





     





     





     


 


Sodium Chloride  20 ml  12/01/19 21:12  12/03/19 05:10





  Normal Saline Flush 0.9%  IVP   20 ml





  PRN PRN   Administration





  After Blood Draw   





     





     





     


 


Witch Hazel/Glycerin  1 pad  12/02/19 10:31  12/02/19 13:22





  Tucks  TOP   1 pad





  PRN PRN   Administration





  ITCHING   





     





     





     














- Lab Result


[place] Diagrams: 


                                 12/02/19 05:00





                                 12/03/19 05:10





Subjective





- Subjective


Patient Reports: Feeling Better, Other (Wife (at bedside) is nervous about 

 being a new diabetic and what to do if there is a qustion, especially a

bout Insulin.)





Objective


Vital Signs: 





                               Vital Signs - 24 hr











  12/02/19 12/02/19 12/02/19





  13:00 14:00 15:00


 


Temperature   


 


Heart Rate [ 103 H 95 85





Monitoring   





electrodes]   


 


Respiratory 12 20 15





Rate   


 


Blood Pressure 97/49 L 98/51 L 





[Left Brachial   





artery]   


 


Blood Pressure   





[Right Brachial   





artery]   


 


O2 Saturation 94 100 100














  12/02/19 12/02/19 12/02/19





  16:00 17:00 18:00


 


Temperature 36.5 C  


 


Heart Rate [ 83 90 93





Monitoring   





electrodes]   


 


Respiratory 20 18 21





Rate   


 


Blood Pressure 118/74 124/92 H 88/41 L





[Left Brachial   





artery]   


 


Blood Pressure   





[Right Brachial   





artery]   


 


O2 Saturation 100 98 97














  12/02/19 12/02/19 12/02/19





  19:00 19:55 20:09


 


Temperature  36.6 C 


 


Heart Rate [ 91 104 H 89





Monitoring   





electrodes]   


 


Respiratory 20 11 L 14





Rate   


 


Blood Pressure 136/64 H  133/75 H





[Left Brachial   





artery]   


 


Blood Pressure   





[Right Brachial   





artery]   


 


O2 Saturation  100 














  12/02/19 12/02/19 12/02/19





  21:24 22:00 23:00


 


Temperature   


 


Heart Rate [ 97 91 90





Monitoring   





electrodes]   


 


Respiratory 13 20 22





Rate   


 


Blood Pressure   





[Left Brachial   





artery]   


 


Blood Pressure 122/65 114/48 L 103/52 L





[Right Brachial   





artery]   


 


O2 Saturation   














  12/03/19 12/03/19 12/03/19





  00:00 01:00 02:00


 


Temperature   


 


Heart Rate [ 86 85 87





Monitoring   





electrodes]   


 


Respiratory 18 14 15





Rate   


 


Blood Pressure   





[Left Brachial   





artery]   


 


Blood Pressure 106/56 L 127/68 120/75





[Right Brachial   





artery]   


 


O2 Saturation 97  














  12/03/19 12/03/19 12/03/19





  03:00 04:00 05:00


 


Temperature   


 


Heart Rate [ 90 86 91





Monitoring   





electrodes]   


 


Respiratory 18 19 20





Rate   


 


Blood Pressure   





[Left Brachial   





artery]   


 


Blood Pressure 130/67 124/58 L 137/56 H





[Right Brachial   





artery]   


 


O2 Saturation   














  12/03/19 12/03/19 12/03/19





  06:00 07:00 08:00


 


Temperature   37.1 C


 


Heart Rate [ 88 87 91





Monitoring   





electrodes]   


 


Respiratory 14 12 16





Rate   


 


Blood Pressure   





[Left Brachial   





artery]   


 


Blood Pressure 133/70 H 141/74 H 107/91 H





[Right Brachial   





artery]   


 


O2 Saturation   100














  12/03/19 12/03/19 12/03/19





  09:00 10:00 11:00


 


Temperature   


 


Heart Rate [ 94 86 82





Monitoring   





electrodes]   


 


Respiratory 19 14 19





Rate   


 


Blood Pressure   





[Left Brachial   





artery]   


 


Blood Pressure 126/66 136/68 H 110/48 L





[Right Brachial   





artery]   


 


O2 Saturation 100 100 100














  12/03/19





  12:00


 


Temperature 36.7 C


 


Heart Rate [ 94





Monitoring 





electrodes] 


 


Respiratory 16





Rate 


 


Blood Pressure 





[Left Brachial 





artery] 


 


Blood Pressure 115/82 H





[Right Brachial 





artery] 


 


O2 Saturation 100








                                     Oxygen











O2 Source                      Room air














I&O (Last 24 Hrs): 





                          Intake and Output Totals x24h











 12/01/19 12/02/19 12/03/19





 23:59 23:59 23:59


 


Intake Total 3541.993 7133.237 2270


 


Output Total 2475 1650 1200


 


Balance 3079.531 9443.237 1070











General: Alert, Oriented x3


HEENT: Mucous membr. moist/pink


Neck: Supple


Neuro: Alert, Non Focal


Cardiovascular: Regular rate


Respiratory: No respiratory distress


Abdomen: No tenderness


Extremities: No edema





- Results


Results: 





                               Laboratory Results











WBC  6.6 x10^3/uL (4.8-10.8)   12/02/19  05:00    


 


RBC  4.54 10^6/uL (4.70-6.10)  L  12/02/19  05:00    


 


Hgb  12.7 g/dL (14.0-18.0)  L  12/02/19  05:00    


 


Hct  38.1 % (42.0-52.0)  L  12/02/19  05:00    


 


MCV  83.9 fL (80.0-94.0)   12/02/19  05:00    


 


MCH  28.0 pg (27.0-31.0)   12/02/19  05:00    


 


MCHC  33.3 g/dL (32.0-36.0)   12/02/19  05:00    


 


RDW  12.7 % (12.0-15.0)   12/02/19  05:00    


 


Plt Count  225 10^3/uL (130-450)   12/02/19  05:00    


 


MPV  11.5 fL (7.4-11.4)  H  12/02/19  05:00    


 


Neut # (Auto)  3.0 10^3/uL (1.5-6.6)   12/02/19  05:00    


 


Lymph # (Auto)  2.6 10^3/uL (1.5-3.5)   12/02/19  05:00    


 


Mono # (Auto)  0.7 10^3/uL (0.0-1.0)   12/02/19  05:00    


 


Eos # (Auto)  0.2 10^3/uL (0.0-0.7)   12/02/19  05:00    


 


Baso # (Auto)  0.0 10^3/uL (0.0-0.1)   12/02/19  05:00    


 


Absolute Nucleated RBC  0.00 x10^3/uL  12/02/19  05:00    


 


Nucleated RBC %  0.0 /100WBC  12/02/19  05:00    


 


VBG pH  7.357  (7.31-7.41)   12/03/19  05:10    


 


VBG pCO2  43.8 mmHg (41-51)   12/03/19  05:10    


 


VBG pO2  65.3 mmHg (25-47)  H  12/03/19  05:10    


 


VBG HCO3  24.0 mmol/L (23-28)   12/03/19  05:10    


 


VBG Total CO2  25.4 mmol/L (24-29)   12/03/19  05:10    


 


VBG O2 Saturation  92.4 % (60-80)  H  12/03/19  05:10    


 


VBG Base Excess  -1.6 mmol/L (-2 - +2)   12/03/19  05:10    


 


Sodium  130 mmol/L (135-145)  L  12/03/19  05:10    


 


Potassium  3.8 mmol/L (3.5-5.0)   12/03/19  05:10    


 


Chloride  97 mmol/L (101-111)  L  12/03/19  05:10    


 


Carbon Dioxide  24 mmol/L (21-32)   12/03/19  05:10    


 


Anion Gap  9.0  (6-13)   12/03/19  05:10    


 


BUN  30 mg/dL (6-20)  H  12/03/19  05:10    


 


Creatinine  1.1 mg/dL (0.6-1.2)   12/03/19  05:10    


 


Estimated GFR (MDRD)  88  (>89)  L  12/03/19  05:10    


 


Glucose  339 mg/dL ()  H  12/03/19  05:10    


 


POC Whole Bld Glucose  330 mg/dL (70 - 100)  H  12/03/19  11:34    


 


Glycated Hemoglobin  12.7 % (4.6-6.2)  H  12/01/19  07:25    


 


Estim Average Glucose  318  ()  H  12/01/19  07:25    


 


Calcium  9.0 mg/dL (8.5-10.3)   12/03/19  05:10    


 


Phosphorus  3.4 mg/dL (2.5-4.6)   12/03/19  05:10    


 


Magnesium  2.2 mg/dL (1.7-2.8)   12/03/19  05:10    


 


Total Bilirubin  1.8 mg/dL (0.2-1.0)  H  12/01/19  07:25    


 


AST  16 IU/L (10-42)   12/01/19  07:25    


 


ALT  42 IU/L (10-60)   12/01/19  07:25    


 


Alkaline Phosphatase  79 IU/L ()   12/01/19  07:25    


 


Total Protein  8.9 g/dL (6.7-8.2)  H  12/01/19  07:25    


 


Albumin  3.4 g/dL (3.2-5.5)   12/03/19  05:10    


 


Globulin  4.1 g/dL (2.1-4.2)   12/01/19  07:25    


 


Albumin/Globulin Ratio  1.2  (1.0-2.2)   12/01/19  07:25    


 


Lipase  56 U/L (22-51)  H  12/01/19  07:25    


 


Urine Color  YELLOW   12/01/19  06:35    


 


Urine Clarity  CLEAR  (CLEAR)   12/01/19  06:35    


 


Urine pH  5.5 PH (5.0-7.5)   12/01/19  06:35    


 


Ur Specific Gravity  <=1.005  (1.002-1.030)   12/01/19  06:35    


 


Urine Protein  NEGATIVE mg/dL (NEGATIVE)   12/01/19  06:35    


 


Urine Glucose (UA)  >=1000 mg/dL (NEGATIVE)  H  12/01/19  06:35    


 


Urine Ketones  40 mg/dL (NEGATIVE)  H  12/01/19  06:35    


 


Urine Occult Blood  NEGATIVE  (NEGATIVE)   12/01/19  06:35    


 


Urine Nitrite  NEGATIVE  (NEGATIVE)   12/01/19  06:35    


 


Urine Bilirubin  NEGATIVE  (NEGATIVE)   12/01/19  06:35    


 


Urine Urobilinogen  0.2 (NORMAL) E.U./dL (NORMAL)   12/01/19  06:35    


 


Ur Leukocyte Esterase  NEGATIVE  (NEGATIVE)   12/01/19  06:35    


 


Ur Microscopic Review  NOT INDICATED   12/01/19  06:35    


 


Urine Culture Comments  NOT INDICATED   12/01/19  06:35    


 


Nasal Screen MRSA (PCR)  NEGATIVE  (NEGATIVE)   12/01/19  14:36    


 


Serum Ketones  NEGATIVE  (NEGATIVE)   12/02/19  05:00

## 2019-12-04 VITALS — DIASTOLIC BLOOD PRESSURE: 77 MMHG | SYSTOLIC BLOOD PRESSURE: 150 MMHG

## 2019-12-04 LAB
ANION GAP SERPL CALCULATED.4IONS-SCNC: 10 MMOL/L (ref 6–13)
BUN SERPL-MCNC: 18 MG/DL (ref 6–20)
CALCIUM UR-MCNC: 9 MG/DL (ref 8.5–10.3)
CHLORIDE SERPL-SCNC: 97 MMOL/L (ref 101–111)
CO2 SERPL-SCNC: 24 MMOL/L (ref 21–32)
CREAT SERPLBLD-SCNC: 1 MG/DL (ref 0.6–1.2)
GFRSERPLBLD MDRD-ARVRAT: 98 ML/MIN/{1.73_M2} (ref 89–?)
GLUCOSE SERPL-MCNC: 283 MG/DL (ref 70–100)
SODIUM SERPLBLD-SCNC: 131 MMOL/L (ref 135–145)

## 2019-12-04 RX ADMIN — ENOXAPARIN SODIUM SCH MG: 100 INJECTION SUBCUTANEOUS at 08:47

## 2019-12-04 RX ADMIN — INSULIN ASPART SCH UNIT: 100 INJECTION, SOLUTION INTRAVENOUS; SUBCUTANEOUS at 08:07

## 2019-12-04 RX ADMIN — SODIUM CHLORIDE, PRESERVATIVE FREE SCH ML: 5 INJECTION INTRAVENOUS at 04:34

## 2019-12-04 RX ADMIN — SODIUM CHLORIDE, PRESERVATIVE FREE SCH ML: 5 INJECTION INTRAVENOUS at 08:44

## 2019-12-04 RX ADMIN — Medication PRN UNIT: at 04:34

## 2019-12-04 RX ADMIN — POLYETHYLENE GLYCOL 3350 SCH GM: 17 POWDER, FOR SOLUTION ORAL at 08:43

## 2019-12-04 RX ADMIN — INSULIN GLARGINE SCH UNIT: 100 INJECTION, SOLUTION SUBCUTANEOUS at 08:08

## 2019-12-04 RX ADMIN — DOCUSATE SODIUM SCH MG: 250 CAPSULE, LIQUID FILLED ORAL at 08:43

## 2019-12-04 NOTE — DISCHARGE SUMMARY
Discharge Summary


Admit Date: 12/01/19


Discharge Date: 12/04/19


Discharging Provider: Dr Ronda Mejia


Primary Care Provider: Rosemarie Sapp


Code Status: Attempt Resuscitation


Condition at Discharge: Stable


Discharge Disposition: 01 Home, Self Care





- DIAGNOSES


Admission Diagnoses: 





(1) Diabetic ketoacidosis





(2) Newly diagnosed diabetes





(3) Acute kidney injury





(4) Hypertension





(5) Hyperlipidemia





(6) Personal history of osteoarthritis





Discharge Diagnoses with Status of Each Condition: 





See below








- HPI


History of Present Illness: 





From the admission H&P of Dr Tamera Sandhu:


Mr. Webster is a 44 year old  w/ a PMH of hypertension and hyperlipidemia 

who presents today for progressively worsening polydipsia and polyuria over the 

course of a week. He had gone to see his primary doctor on Wednesday regarding 

this. Labs were drawn and he has not heard anything back. His symptoms were 

continuing to worsen, and he began to develop weakness and blurred vision so he 

decided to come to the ED. On arrival, blood glucose was 982. A VBG was checked 

and resulted as follows: pH: 7.27, CO2: 35 and HCO3: 15.8. He also had an anion 

gap of 24, a sodium of 121 and a creatinine of 2. He was admitted for diabetic 

ketoacidosis from a new diagnosis of diabetes mellitus type 2.   








- HOSPITAL COURSE


Hospital Course: 





1) DKA


He was admitted to the ICU and started on a DKA protocol with IV insulin drip 

and frequent glucose, ketone and electrolyte monitoring.  Hos A1c was 12.7. He 

received aggressive IV hydration.  When he became more alert after several days,

he started to get diabetic education, along with his wife.  His glucoses were 

running in the 200-300 range on a diabetic diet, Lantus insulin, mealtime 

insulin and was put on a moderate to high dose aspart insulin correction sliding

scale.  The patient was advised to see his PCP and he was able to arrange to be 

seen by the diabetic nurse at the [facility] clinic in 2 days, he said.





2) BRITTA


His BUN/creatinine improved from 54/2.0 to 18/1.0 at discharge





3) Newly diagnosed DM


Patient had multiple visits from our hospital diabetic nurse educator and 

practiced subcutaneous injections on his own.  The wife also setting on 

nutrition visits regarding his diet. He was giving several educational handouts 

regarding management of comorbidities in a diabetic.





4) HTN


He was kept on his same blood pressure medications while here





5) Hx hyperlipidemia


Although he carries this diagnosis, he was not on home statin medication








- ALLERGIES


Allergies/Adverse Reactions: 


                                    Allergies











Allergy/AdvReac Type Severity Reaction Status Date / Time


 


No Known Drug Allergies Allergy   Verified 12/01/19 06:00














- MEDICATIONS


Home Medications: 


                                Ambulatory Orders











 Medication  Instructions  Recorded  Confirmed


 


Terbinafine [Lamisil] 250 mg PO DAILY 12/02/19 12/02/19


 


Blood Sugar Diagnostic [Freestyle 1 each  ACHS #100 strip 12/04/19 





Lite Test Strip]   


 


Blood-Glucose Meter [Glucometer] 1 each  ACHS #1 each 12/04/19 


 


Insulin Aspart [NovoLOG] 3 - 11 unit SUBQ 12/04/19 





 0800,1200,1700,2100 #2 pen  


 


Insulin Aspart [NovoLOG] 7 unit SUBQ TIDWM #2 pen 12/04/19 


 


Insulin Glargine [Lantus Solostar] 25 unit SUBQ BID #4 pen 12/04/19 


 


Lancets 1 each  ACHS #100 each 12/04/19 


 


Losartan [Cozaar] 50 mg PO DAILY #30 tablet 12/04/19 


 


Pen Needle, Diabetic [Insulin Pen 1 each  ACHS #100 dis.needle 12/04/19 





Needle]   














- PHYSICAL EXAM AT DISCHARGE


General Appearance: positive: No acute distress, Alert


Eyes Bilateral: positive: Normal inspection, PERRL, EOMI


ENT: positive: ENT inspection nml, No signs of dehydration


Neck: positive: Nml inspection


Respiratory: positive: No respiratory distress


Cardiovascular: positive: Regular rate & rhythm, No murmur


Abdomen: positive: Non-tender, No distention


Skin: positive: Color nml


Extremities: positive: No pedal edema


Neurologic/Psychiatric: positive: Oriented x3, Other (Grossly normal. sensation 

not tested)





- LABS


Result Diagrams: 


                                 12/02/19 05:00





                                 12/04/19 04:35





- DIAGNOSTIC IMAGING


Diagnostic Imaging Results: Final report reviewed





- FOLLOW UP


Follow Up: 





See PCP, Diabetic Nurse specialist ASAP.








- TIME SPENT


Time Spent in Discharge (Minutes): 60

## 2021-01-08 ENCOUNTER — HOSPITAL ENCOUNTER (OUTPATIENT)
Dept: HOSPITAL 76 - SC | Age: 46
Discharge: HOME | End: 2021-01-08
Attending: NURSE PRACTITIONER
Payer: COMMERCIAL

## 2021-01-08 VITALS — DIASTOLIC BLOOD PRESSURE: 90 MMHG | SYSTOLIC BLOOD PRESSURE: 133 MMHG

## 2021-01-08 DIAGNOSIS — G47.10: ICD-10-CM

## 2021-01-08 DIAGNOSIS — R06.81: ICD-10-CM

## 2021-01-08 DIAGNOSIS — G47.8: ICD-10-CM

## 2021-01-08 DIAGNOSIS — E66.01: ICD-10-CM

## 2021-01-08 DIAGNOSIS — R06.83: Primary | ICD-10-CM

## 2021-01-08 PROCEDURE — 99212 OFFICE O/P EST SF 10 MIN: CPT

## 2021-01-08 PROCEDURE — 99203 OFFICE O/P NEW LOW 30 MIN: CPT

## 2021-01-08 NOTE — SLEEP CARE CONSULTATION
Information from patient questionnaire entered by Chio Conner.





I have reviewed and concur with the information entered by Chio Conner. 

This document represents the service I personally performed and the decisions 

made by me, Yadira Pringle ARNP.





History of Present Illness


Service Date and Time: 01/08/2021    0946


Reason for Visit: New patient


Chief Complaint: reports: Unrefreshed sleep (not completely; most do feel 

rested), Snoring, Excessive daytime sleepiness, Observed pauses in breathing, 

Frequent awakenings at night.  denies: Fatigue


Date of Onset: not sure


Usual bedtime: 10:30-11 pm


Time it takes to fall asleep: 10-15 minutes


Snores at night: Yes


Observed to quit breathing while asleep: Yes


Sleeps alone due to snoring: No


Number of times waking at night: 3


Reasons for waking at night: reports: Snoring, Bathroom.  denies: Choking, 

Gasping for air


Toss, Turn, or Twitch while sleeping: No


Recalls having dreams: Yes


Usually gets out of bed at: 5:55 am; weekends 7 am


Feels refreshed in the morning: No


Morning headache: Yes (rarely; 1 x a week, gone quickly in morning)


Sleepy or fatigued during the day: No


Ever fallen asleep while driving: No


Takes day naps: Yes (2 times a week; lasts about 15 minutes)


Dreams during day naps: No


Prior sleep studies: No


Additional HPI information: 





I had the pleasure of seeing NAZ BRUSH today regarding the possibility of him 

having a sleep disorder. His current complaint is observed pauses in breathing. 

Wife has told him he has paused as long as 12 seconds when sleeping. He does 

snore but his wife still sleeps in same room. He states sometimes he will not 

feel rested in the morning but mostly he does. He does wake up frequently at 

night to use the restroom but has woken himself up snoring. He has a history of 

hypertension and diabetes. 








- Parasomnia Symptoms


Ever been unable to move upon waking from sleep: No


Walks in sleep: No


Talks in sleep: No


Ever acted out dreams in sleep: No


Ever felt weak in the knees when startled or emotional: No


Bothered by creepy, crawly, restless sensations in legs: No


Problems with memory or concentration: No





Subjective


Initial [place] Sleepiness Scale score: 9 (in 2020)





Past Medical History


Past Medical History: reports: Hypertension, Diabetes, Arthritis.  denies: 

Arrythmia, Anxiety, Depression, GERD





Social History


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 Patient is  

and lives in [place]. 





Have you smoked in the past 12 months: No


Alcohol use: Yes


Alcohol amount and frequency: 1-2 drinks per month


Caffeine use: Yes


Caffeine amount and frequency: 3 times a week





Family History


Family history of sleep disordered breathing: No





Allergies and Home Medications


Drug allergies reviewed: Yes (NKDA)


Home medication list reviewed: Yes


Allergy and home medication list: 





Losartan


Lantus


cholesterol pill





Review of Systems


Weight gain over past 5 years: 50


Cardiovascular: reports: high blood pressure.  denies: irregular heart rate or 

pulse


Gastrointestinal: denies: heartburn


Neurological: denies: headaches, head trauma


Ear/Nose/Throat: reports: wisdom teeth removed.  denies: nasal congestion, sinus

problems, dry mouth/throat, injury to nose, tonsillectomy


Musculoskeletal: reports: joint pain


Immunologic: denies: allergies to food or environment





Physical Exam


Blood Pressure: 133/90


Cuff size: large


Heart Rate: 75


O2 Saturation: 98


Height: 6 ft 5 in


Weight: 365 lb


Body Mass Index: 43.2


BMI Classification: Morbidly Obese


Neck circumference: 19 (inches)


Nostrils: patent to airflow


Turbinates: swollen


Septum: midline


Mouth and throat: narrow oropharynx


Soft palate: long


Uvula: edematous


Uvula visualization: 25% Mallampati Class III


Tongue: enlarged in size with teeth marks on lateral edges


Tonsils: 1+


Heart: regular rate and rhythm


Lungs: clear bilaterally





Impression and Plan





1. Suspected Obstructive Sleep Apnea-Hypopnea Syndrome, as suggested by a h

istory of loud and irregular snoring, observed cessation of breath while asleep,

frequent awakening during the night, unrefreshed sleep, and excessive daytime 

sleepiness. I reviewed with the patient that a narrow oropharynx and obesity are

common predisposing factors for obstructive sleep apnea-hypopnea syndrome. I 

recommend proceeding to polysomnography to confirm the diagnosis and to assess 

severity. If the patient has significant sleep disordered breathing, a manual 

CPAP titration study will also be performed to find the optimal treatment 

pressure. I informed the patient of what the sleep studies involve and after 

some discussion, obtained agreement to proceed. The pathophysiology of 

obstructive sleep apnea-hypopnea syndrome was discussed with the patient and 

health risks of cardiovascular and cerebrovascular disease if not treated. AAS 

brochure for obstructive sleep apnea-hypopnea syndrome given and reviewed. Risks

of drowsy driving discussed in detail and patient advised to avoid long distance

driving and to pull over at the first sign of drowsiness. Patient agreed to 

plan. 





* Schedule polysomnography +- manual CPAP titration study and return in 1-2 

  weeks after the study to discuss result and initiate therapy.


* Avoid long distance driving or driving when feeling sleepy.


* Avoid alcohol, sedative and muscle relaxant around bedtime.


* Attempt to lose weight.


* Review instructions provided by trained office staff on how to prepare for the

  sleep study.


* Return for follow-up after sleep study completed.








Visit Type: In Office


Provider Statement: I spent 100% of the Face to Face Visit with the patient with

greater than 50% spent counseling the patient and coordination of care.

## 2021-01-13 NOTE — EXTERNAL MEDICAL SUMMARY RPT
Continuity of Care Document

                           Created on:2021



Patient:NAZ BRUSH

Sex:Male

:1975

External Reference #:[id number]





Demographics







                          Phone                     Unavailable

 

                          Preferred Language        Unknown

 

                          Marital Status            Unknown

 

                          Adventist Affiliation     Unknown

 

                          Race                      Unknown

 

                          Ethnic Group              Unknown









Author







                          Organization              [facility]

 

                          Address                    [place] [address]

 

                          Phone                     [phone]









Support







                Name            Relationship    Address         Phone

 

                RETI            Unavailable     Unavailable     Unavailable









Care Team Providers







                    Name                Role                Phone

 

                    ALMEIDA              Unavailable         Unavailable









Allergies







                     date                description         facility

 

                                         ADHESIVE \T\ TAPE   [facility]

 

                                         CAT HAIR EXTRACT    [facility]

 

                                         CIPROFLOXACIN       [facility]

 

                                         CLARITHROMYCIN      [facility]

 

                                         [facility]IN         [facility]

 

                                         DUST MITE EXTRACT   [facility]

 

                                         ERYTHROMYCIN        [facility]

 

                                         [facility]

 

                                         ALMOND              [facility]

 

                                         EGG WHITE           [facility]

 

                                         EGG                 [facility]

 

                                         LATEX               [facility]

 

                                         MUSHROOM            [facility]

 

                                         SHELLFISH           [facility]

 

                                         No Known Drug Allergies  [facility]







Social History







                     date                description         facility

 

                     [id number]

## 2021-01-18 ENCOUNTER — HOSPITAL ENCOUNTER (OUTPATIENT)
Dept: HOSPITAL 76 - SC | Age: 46
Discharge: HOME | End: 2021-01-18
Attending: NURSE PRACTITIONER
Payer: COMMERCIAL

## 2021-01-18 DIAGNOSIS — E66.9: ICD-10-CM

## 2021-01-18 DIAGNOSIS — G47.33: Primary | ICD-10-CM

## 2021-01-18 DIAGNOSIS — R09.02: ICD-10-CM

## 2021-01-18 PROCEDURE — 95806 SLEEP STUDY UNATT&RESP EFFT: CPT

## 2021-01-28 ENCOUNTER — HOSPITAL ENCOUNTER (OUTPATIENT)
Dept: HOSPITAL 76 - SC | Age: 46
Discharge: HOME | End: 2021-01-28
Attending: NURSE PRACTITIONER
Payer: COMMERCIAL

## 2021-01-28 DIAGNOSIS — E66.01: ICD-10-CM

## 2021-01-28 DIAGNOSIS — G47.33: Primary | ICD-10-CM

## 2021-01-28 PROCEDURE — 99213 OFFICE O/P EST LOW 20 MIN: CPT

## 2021-01-28 PROCEDURE — 99212 OFFICE O/P EST SF 10 MIN: CPT

## 2021-01-28 NOTE — SLEEP CARE CONSULTATION
Information from patient questionnaire entered by Shelley Scherer.





I have reviewed and concur with the information entered by Shelley Scherer. This 

document represents the service I personally performed and the decisions made by

me, Yadira Pringle ARNP.





History of Present Illness


Service Date and Time: 01/28/2021    1617


Initial [place] Sleepiness Scale score: 9 (in 2020)


Current [place] Sleepiness Scale score: 10


Additional HPI information: 





NAZ BRUSH returns for follow up and results of the recently performed home 

sleep study.





I explained the pathophysiology behind obstructive sleep apnea. We then spent 

quite a bit of time discussing different treatment options.  For mild 

obstructive sleep apnea, surgery and oral appliance are alternatives to nasal 

CPAP therapy but in moderate or severe cases, nasal CPAP is the most effective 

and reliable treatment.   





Because apnea is primarily in supine position, then positional management 

therapy could be effective. Methods discussed such as positioning with pillows, 

using a T-shirt with tennis balls in the back. I reviewed the impact of weight 

changes on sleep apnea and strongly recommended losing weight. After some 

discussion, the patient opted to go with the nasal CPAP therapy.  Nasal autoCPAP

set at [id number] will be ordered with rationale explained. A manual titration 

study will be ordered if unable to find optimal pressure with office 

adjustments. 





I explained how CPAP machine works with sample devices [employer] Dreamstation 

and [employer] [id number] and what to expect when using the machine.  Using CPAP 

every night in order to get used to it was emphasized.  Patient advised to put 

CPAP mask on before getting into bed so as not to fall asleep without CPAP.  To 

assist acclimation to CPAP use, it could also be used for a short time during 

day while reading or watching TV. The patient was instructed to call the CPAP 

supplier to discuss any mechanical problem that may occur. If the mask given is 

uncomfortable or is difficult to keep on through the night even with adjustment,

contact the CPAP supplier as many will replace with another mask style if 

notified before  30 days.  If snoring or perceives is not getting enough air or 

too much air from the machine, notify this office.  AASM patient education PAP 

tips reviewed and given to patient. Patient counseled not drink alcohol less 

than 4 hours before bedtime as it can increase snoring and apnea. Patient was 

cautioned about risks of drowsy driving until sleepiness symptoms resolve. 








Sleep Study





- Results


Prior sleep studies: No


Polysomnography/Home Sleep Study results: 





Physician Impression:


The quality of the study is good. The length of the study is adequate (> 240 

minutes). Please also see the


tabulated and graphic data.





1. Obstructive Sleep Apnea-Hypopnea (ICD-10 G47.33), moderate, with an AHI of 

26.6/hr and lino


SaO2 of 72%. During the study, the patient had 29 apneas (29 obstructive, 0 

central, 0 mixed) and 118


hypopneas. The longest episode lasted 70.0 seconds. The respiratory events 

occurred more frequently


during supine sleep (supine AHI was 60.8 and non-supine, 17.64).


2. Hypoxemia (ICD-10 R09.02), moderate, with the lowest oxygen saturation of 72 

% and 18.8 minutes


with SaO2 under 90%. Baseline oxygen saturation was normal (Average oxygen 

saturation was 93%).





Allergies and Home Medications


Drug allergies reviewed: Yes (NKDA)


Home medication list reviewed: Yes (no changes)





Review of Systems


Review of systems same as previous: Yes (no changes)





Physical Exam


Heart Rate: 80


O2 Saturation: 98


Height: 6 ft 5 in


Weight: 368 lb


Body Mass Index: 43.6


BMI Classification: Morbidly Obese





Impression and Plan





1. Obstructive Sleep Apnea-Hypopnea Syndrome, moderate, with lowest oxygen 

saturation of 72%. Obviously this is the cause of the patients symptoms of 

unrefreshed sleep, and excessive daytime sleepiness. Positive pressure therapy 

could benefit his hypertension and diabetes. As mentioned above, the patient 

will be started on nasal autoCPAP therapy with pressure set at 4-15 cmH2O. A 

manual titration study will be completed if unable to find optimal treatment 

pressure with office adjustments. Compliance guidelines also reviewed. A copy of

compliance guidelines will be given for reference at check out. Because the 

apnea is more severe supine, I instructed to avoid sleeping supine using pillow 

positioning until able to start CPAP use. 





* Nasal auto CPAP therapy, pressure at 4-15 cm H2O.


* Attempt to lose weight.


* Avoid alcohol consumption near bedtime.


* Avoid supine sleep until using CPAP.


* The patient is again cautioned about driving until sleepiness completely 

  resolves.


* Return one month after CPAP obtained. I will assess response to therapy and 

  compliance at that time.





Counseling Topics: Weight loss health impact


Visit Type: In Office


Time Spent with Patient (minutes): 20


Provider Statement: I spent 100% of the Face to Face Visit with the patient with

greater than 50% spent counseling the patient and coordination of care.

## 2021-04-16 ENCOUNTER — HOSPITAL ENCOUNTER (OUTPATIENT)
Dept: HOSPITAL 76 - SC | Age: 46
Discharge: HOME | End: 2021-04-16
Attending: NURSE PRACTITIONER
Payer: COMMERCIAL

## 2021-04-16 DIAGNOSIS — G47.33: Primary | ICD-10-CM

## 2021-04-16 DIAGNOSIS — E66.01: ICD-10-CM

## 2021-04-16 PROCEDURE — 99212 OFFICE O/P EST SF 10 MIN: CPT

## 2021-04-16 NOTE — SLEEP CARE CONSULTATION
Information from patient questionnaire entered by Chio Conner.





I have reviewed and concur with the information entered by Chio Conner. 

This document represents the service I personally performed and the decisions 

made by me, Yadira Pringle ARNP.





History of Present Illness


Service Date and Time: 2021    0808


Previous diagnosis: Moderate, Obstructive Sleep Apnea-Hypopnea Syndrome


AHI: 26.6 (in )


Reason for follow up: first compliance


Equipment type: CPAP


Equipment obtained from: Other ([facility]; got initial supplies)


Mask style: Full face


Backup mask available: Yes (other mask)


Last cushion change: 1 month


Prior sleep studies: Yes


Year and Where:  - [facility]


Type of Sleep Study: Home sleep study


HPI additional information: 





NAZ BRUSH was diagnosed to have moderate, AHI 26.6, obstructive sleep apnea-

hypopnea syndrome and returned today for CPAP therapy first compliance follow-

up.





CPAP Compliance Data





- Data Reviewed with Patient


Average duration of nightly device use: 5 hr 3 min


Compliance rate %: 70


Current pressure setting (cmH2O): 4-15 (median 13.4, avg 14.8, max 15.0)


Humidity settin


Average residual AHI: 0.6





Subjective


Patient concerns: reports: dry mouth, nose, throat.  denies: aerophagia, mask 

discomfort, air blowing in eyes, mask leak noise, condensation in mask/hose, 

nasal congestion, epistaxis, other


Observed to snore while using device: No


Current pressure setting perceived as: comfortable


On therapy, patient: reports: sleeping better, awakening more refreshed, being 

more awake and alert during the day, more rested overall.  denies: drowsiness 

while driving


Initial [place] Sleepiness Scale score: 9 (in )


Current [place] Sleepiness Scale score: 10





Allergies and Home Medications


Drug allergies reviewed: Yes (NKDA)


Home medication list reviewed: Yes (no new meds)





Review of Systems


Review of systems same as previous: Yes (no changes)





Physical Exam


Heart Rate: 77


O2 Saturation: 98


Height: 6 ft 5 in


Weight: 373 lb


Body Mass Index: 44.2


BMI Classification: Morbidly Obese





Impression and Plan





1. Obstructive Sleep Apnea-Hypopnea Syndrome, moderate, with fair treatment 

compliance and excellent apnea control. On CPAP therapy, the patient has better 

sleep quality and is more rested overall. He has had some mouth dryness but was 

unsure how to increase the humidity. Oral dryness can be reduced by adjusting 

humidity setting higher or heated hose lower or by adjusting both settings. 

Verbal instructions given on how to change humidity and heated hose settings 

with rationale explaining why to change. He has significant improvement of his 

sleep apnea and is satisfied with his treatment. I will adjust his pressure to 

reflect those he is using to 13-15 cmH2O. He voiced understanding and agreement 

with plan. Patient's apnea severity and rationale for treatment to reduce apnea,

improve sleep quality and reduce cardiovascular and cerebrovascular events was 

reviewed. I also reviewed the benefit of consistent device use of CPAP for 

hypertension and diabetes.





* Change auto CPAP pressure to 13-15  cmH2O


* Notify me if snoring with mask or feeling that the pressure is too much or too

  little


* Attempt to lose weight


* Call this office if any problems using CPAP


* Return for follow up in 1-2 months, or sooner if concerns arise





Counseling Topics: Spare mask, Weight loss health impact


Visit Type: In Office


Time Spent with Patient (minutes): 14


Provider Statement: I spent 100% of the Face to Face Visit with the patient with

greater than 50% spent counseling the patient and coordination of care.

## 2021-06-16 ENCOUNTER — HOSPITAL ENCOUNTER (OUTPATIENT)
Dept: HOSPITAL 76 - SC | Age: 46
Discharge: HOME | End: 2021-06-16
Attending: NURSE PRACTITIONER
Payer: COMMERCIAL

## 2021-06-16 DIAGNOSIS — G47.33: Primary | ICD-10-CM

## 2021-06-16 DIAGNOSIS — E66.01: ICD-10-CM

## 2021-06-16 PROCEDURE — 99212 OFFICE O/P EST SF 10 MIN: CPT

## 2021-06-16 NOTE — SLEEP CARE CONSULTATION
Information from patient questionnaire entered by Chio Conner.





I have reviewed and concur with the information entered by Chio Conner. 

This document represents the service I personally performed and the decisions 

made by me, Yadira Pringle ARNP.





History of Present Illness


Service Date and Time: 2021    0741


Previous diagnosis: Moderate, Obstructive Sleep Apnea-Hypopnea Syndrome


AHI: 26.6


Reason for follow up: other (2 month with pressure change)


Equipment type: CPAP


Equipment obtained from: Other ([facility]; gettting supplies as 

needed)


Mask style: Full face


Backup mask available: Yes (old mask)


Last cushion change: 1 month


Prior sleep studies: Yes


Year and Where:  - [facility]


Type of Sleep Study: Home sleep study


HPI additional information: 





NAZ BRUSH was diagnosed to have moderate, AHI 26.6, obstructive sleep apnea-

hypopnea syndrome and returned today for CPAP therapy two month pressure change 

follow-up.





CPAP Compliance Data





- Data Reviewed with Patient


Average duration of nightly device use: 4 hr 25 min


Compliance rate %: 60 (60 days)


Current pressure setting (cmH2O): 13-15


Humidity settin


Average residual AHI: 0.6


Central apnea: 0.0


Obstructive apnea: 0.0





Subjective


Missed days of use due to: reports: other (still getting used to it)


Patient concerns: reports: mask discomfort, dry mouth, nose, throat (dry mouth).

 denies: aerophagia, air blowing in eyes, mask leak noise, condensation in 

mask/hose, nasal congestion, epistaxis, other


Observed to snore while using device: No


Current pressure setting perceived as: comfortable


On therapy, patient: reports: sleeping better, awakening more refreshed, being 

more awake and alert during the day, more rested overall.  denies: drowsiness 

while driving


Initial [place] Sleepiness Scale score: 9 (in 2020)


Current [place] Sleepiness Scale score: 6





Allergies and Home Medications


Home medication list reviewed: Yes (no changes)





Review of Systems


Review of systems same as previous: Yes (no changes)





Physical Exam


Heart Rate: 88


O2 Saturation: 97


Height: 6 ft 5 in


Weight: 363 lb


Body Mass Index: 43.0


BMI Classification: Morbidly Obese





Impression and Plan





1. Obstructive Sleep Apnea-Hypopnea Syndrome, moderate, with fair treatment 

compliance and good apnea control. On CPAP therapy, the patient has better sleep

quality and is more rested overall. His compliance decreased but he states that 

on the days that he did not use the CPAP it was just because he is still getting

used to the process.  He finds himself waking up around the 4-hour xavi and will

take the mask off so he can sleep more comfortably.  He does have a full face 

mask with a hose coming out the front and is worried about getting tangled up in

the hose. I discussed with him changing to a different full face mask set up 

with a hose at the top of the head when he is eligible for a new set of headgear

and he voiced understanding. His current pressure setting and is comfortable and

working very well.  I will have him follow-up in 3 months for a recheck.  I 

strongly encouraged that he increase days of use with the CPAP. Compliance 

guidelines reviewed for insurance coverage. Optimal use of CPAP is use of CPAP 

with all sleep to obtain maximum benefit of treatment. Patient is encouraged to 

use CPAP with all sleep. Patient's apnea severity and rationale for treatment to

reduce apnea, improve sleep quality and reduce cardiovascular and 

cerebrovascular events was reviewed. I also reviewed the benefit of consistent 

device use of CPAP for hypertension and diabetes.





* Continue auto  CPAP pressure at 13-15 cmH2O


* Notify me if snoring with mask or feeling that the pressure is too much or too

  little


* Continue to try to lose weight


* Call this office if any problems using CPAP


* Return for follow up in 3 months, or sooner if concerns arise





Counseling Topics: Spare mask, Weight loss health impact


Visit Type: In Office


Time Spent with Patient (minutes): 17


Provider Statement: I spent 100% of the Face to Face Visit with the patient with

greater than 50% spent counseling the patient and coordination of care.

## 2021-09-17 ENCOUNTER — HOSPITAL ENCOUNTER (OUTPATIENT)
Dept: HOSPITAL 76 - SC | Age: 46
Discharge: HOME | End: 2021-09-17
Attending: NURSE PRACTITIONER
Payer: COMMERCIAL

## 2021-09-17 DIAGNOSIS — E66.01: ICD-10-CM

## 2021-09-17 DIAGNOSIS — G47.33: Primary | ICD-10-CM

## 2021-09-17 PROCEDURE — 99212 OFFICE O/P EST SF 10 MIN: CPT

## 2021-09-17 NOTE — SLEEP CARE CONSULTATION
Information from patient questionnaire entered by Chio Conner.





I have reviewed and concur with the information entered by Chio Conner. 

This document represents the service I personally performed and the decisions 

made by me, Yadira Pringle ARNP.





History of Present Illness


Service Date and Time: 2021    0751


Previous diagnosis: Moderate, Obstructive Sleep Apnea-Hypopnea Syndrome


AHI: 26.6 (in )


Reason for follow up: three month


Equipment type: CPAP


Equipment obtained from: Other ([facility]; getting supplies as 

needed)


Mask style: Full face


Backup mask available: Yes (old mask)


Last cushion change: 1 month


Prior sleep studies: Yes


Year and Where:  - [facility]


Type of Sleep Study: Home sleep study


HPI additional information: 





NAZ BRUSH was diagnosed to have moderate, AHI 26.6, obstructive sleep apnea-

hypopnea syndrome and returned today for CPAP therapy three month follow-up.





CPAP Compliance Data





- Data Reviewed with Patient


Average duration of nightly device use: 4 hr 38 min


Compliance rate %: 82 (90 days)


Current pressure setting (cmH2O): 13-15


Humidity settin


Average residual AHI: 0.5





Subjective


Patient concerns: reports: air blowing in eyes (needs adjustment), dry mouth, 

nose, throat (not constant, occasional).  denies: aerophagia, mask discomfort, 

mask leak noise, condensation in mask/hose, nasal congestion, epistaxis, other


Observed to snore while using device: No


Current pressure setting perceived as: comfortable


On therapy, patient: reports: sleeping better, awakening more refreshed, being 

more awake and alert during the day, more rested overall.  denies: drowsiness 

while driving


Initial [place] Sleepiness Scale score: 9 (in )


Current [place] Sleepiness Scale score: 4





Allergies and Home Medications


Home medication list reviewed: Yes (no changes)





Review of Systems


Review of systems same as previous: Yes (no changes)





Physical Exam


Heart Rate: 85


O2 Saturation: 96


Height: 6 ft 5 in


Weight: 351 lb


Weight change since last visit: 12 lb loss


Body Mass Index: 41.6


BMI Classification: Morbidly Obese





Impression and Plan





1. Obstructive Sleep Apnea-Hypopnea Syndrome, moderate, with good treatment 

compliance and excellent apnea control. On CPAP therapy, the patient has better 

sleep quality and is more rested overall. Patient has lost weight. Currently 

patients BMI is 41.6. He is going to gym more often and he is a  for 

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 He is also trying to eat better food 

choices. Obesity increases the risk of apnea, CPAP pressure requirements and 

overall health risks especially cardiovascular and diabetes. Thus patient is 

advised to continue to lose weight. The patient's CPAP pressure range should 

accommodate some weight loss. Symptoms to report for additional pressure 

adjustment discussed. Patient's apnea severity and rationale for treatment to 

reduce apnea, improve sleep quality and reduce cardiovascular and 

cerebrovascular events was reviewed. I also reviewed the benefit of consistent 

device use of CPAP for hypertension and diabetes.





* Continue auto  CPAP pressure at 13-15 cmH2O


* Notify me if snoring with mask or feeling that the pressure is too much or too

  little


* Attempt to lose weight


* Call this office if any problems using CPAP


* Return for follow up in 6 months, or sooner if concerns arise





Counseling Topics: Spare mask, Weight loss health impact


Visit Type: In Office


Time Spent with Patient (minutes): 13


Provider Statement: I spent 100% of the Face to Face Visit with the patient with

greater than 50% spent counseling the patient and coordination of care.

## 2022-03-11 ENCOUNTER — HOSPITAL ENCOUNTER (EMERGENCY)
Dept: HOSPITAL 76 - ED | Age: 47
Discharge: HOME | End: 2022-03-11
Payer: COMMERCIAL

## 2022-03-11 VITALS — DIASTOLIC BLOOD PRESSURE: 82 MMHG | SYSTOLIC BLOOD PRESSURE: 170 MMHG

## 2022-03-11 DIAGNOSIS — I10: ICD-10-CM

## 2022-03-11 DIAGNOSIS — L02.31: Primary | ICD-10-CM

## 2022-03-11 PROCEDURE — 10060 I&D ABSCESS SIMPLE/SINGLE: CPT

## 2022-03-11 NOTE — ED PHYSICIAN DOCUMENTATION
History of Present Illness





- Stated complaint


Stated Complaint: MALE /BACK PX





- Chief complaint


Chief Complaint: Wound





- Additonal information


Additional information: 





46-year-old male presents emergency department for evaluation of 3 days right 

lower lateral pain at his gluteal cleft.  He tells this provider that about 6 

years ago he had it drained in an office and was told that it was cerebrospinal 

fluid because he has a very long spine.





Patient reports that he has tenderness when he sits on this.  No fevers.  No 

urinary or bowel incontinence.





Review of Systems


Constitutional: denies: Fever, Chills


Throat: reports: Reviewed and negative


Cardiac: reports: Reviewed and negative


Respiratory: reports: Reviewed and negative


GI: reports: Reviewed and negative


: reports: Reviewed and negative


Skin: reports: Lesions


Musculoskeletal: reports: Reviewed and negative





PD PAST MEDICAL HISTORY





- Past Medical History


Cardiovascular: Hypertension, High cholesterol


Respiratory: None


Neuro: None


Endocrine/Autoimmune: None


GI: Other (Umbilical hernia)


GYN: None


: None


HEENT: None


Musculoskeletal: Osteoarthritis, Gout (Pt and wife didn't mention, but found hx 

in EHR)


Derm: None





- Past Surgical History


Past Surgical History: Yes


Ortho: Knee replacement


Cardiovascular: Vascular surgery





- Present Medications


Home Medications: 


                                Ambulatory Orders











 Medication  Instructions  Recorded  Confirmed


 


Terbinafine [Lamisil] 250 mg PO DAILY 12/02/19 12/02/19


 


Blood Sugar Diagnostic [Freestyle 1 each  ACHS #100 strip 12/04/19 





Lite Test Strip]   


 


Blood-Glucose Meter [Glucometer] 1 each  ACHS #1 each 12/04/19 


 


Insulin Aspart [NovoLOG] 3 - 11 unit SUBQ 12/04/19 





 0800,1200,1700,2100 #2 pen  


 


Insulin Aspart [NovoLOG] 7 unit SUBQ TIDWM #2 pen 12/04/19 


 


Insulin Glargine [Lantus Solostar] 25 unit SUBQ BID #4 pen 12/04/19 


 


Lancets 1 each  ACHS #100 each 12/04/19 


 


Losartan [Cozaar] 50 mg PO DAILY #30 tablet 12/04/19 


 


Pen Needle, Diabetic [Insulin Pen 1 each  ACHS #100 dis.needle 12/04/19 





Needle]   


 


HYDROcod/ACETAM 5/325 [Norco 5/325] 1 tablet PO BID PRN #10 tablet 03/11/22 


 


Sulfamethox/Trimeth 800/160 1 each PO BID #14 tablet 03/11/22 





[Bactrim Ds 800/160]   














- Allergies


Allergies/Adverse Reactions: 


                                    Allergies











Allergy/AdvReac Type Severity Reaction Status Date / Time


 


No Known Drug Allergies Allergy   Verified 03/11/22 18:19














- Social History


Does the pt smoke?: No


Smoking Status: Never smoker


Does the pt drink ETOH?: No


Does the pt have substance abuse?: No





- Immunizations


Immunizations are current?: Yes





- POLST


Patient has POLST: No


POLST Status: Full Code





PD ED PE EXPANDED





- Derm


Derm: Abscess (2 cm fluctuant fluid collection just right of midline gluteal 

cleft.  Some tenderness but no active drainage.)





- Neuro


Neuro: Alert and Oriented X 3, CNII-XII intact, Normal gait





Results





- Vitals


Vitals: 


                               Vital Signs - 24 hr











  03/11/22





  18:15


 


Temperature 35.9 C L


 


Heart Rate 89


 


Respiratory 16





Rate 


 


Blood Pressure 170/82 H


 


O2 Saturation 98








                                     Oxygen











O2 Source                      Room air

















Procedures





- Abscess I&D (location)


  ** right gluteal cleft/buttock


Preparation: Lidocaine 1%


Incision: Incised with scalpel, Purulent drainage, Packed


Other: Antibiotic prescribed





PD MEDICAL DECISION MAKING





- ED course


Complexity details: considered differential, d/w patient


ED course: 





46-year-old male presents emergency department for an abscess/fluid collection 

just right of his gluteal cleft.  Symptoms began about 1 week ago.  He noted 

drainage while working out at the gym today.  On exam he did have a 2 cm fluid 

collection.  It was drained at the bedside but not as much drained as I had 

anticipated.  It was packed.  He will be started on Bactrim.  Advised Epson salt

soaks.  Emergent worrisome return precautions otherwise discussed.





I am prescribing a short course of short-acting opioid pain medication for this 

patient. I have reviewed the patients  and no concerning findings were 

noted. I have discussed that the opioids are for short term therapy only, and 

will not be refilled from the ED.





Departure





- Departure


Disposition: 01 Home, Self Care


Clinical Impression: 


 Abscess, gluteal, right





Condition: Stable


Record reviewed to determine appropriate education?: Yes


Instructions:  ED Abscess IandD


Prescriptions: 


Sulfamethox/Trimeth 800/160 [Bactrim Ds 800/160] 1 each PO BID #14 tablet


HYDROcod/ACETAM 5/325 [Norco 5/325] 1 tablet PO BID PRN #10 tablet


 PRN Reason: Pain


Comments: 


Niall you are seen today in the emergency department for an abscess near your 

buttock.  It sounds like most of it drained while at the gym though we did do an

incision and drainage at the bedside and a small amount of purulent fluid did 

come out.  We did place a small amount of shoestring packing within the wound.  

If it falls out that is okay but I would like it to remain in place for about 24

hours.





Please take a warm Epson salt bath to help irrigate and flush the wound as well 

as reduce inflammation.  I sent a prescription for an antibiotic as well as some

pain medicine to the [facility] in [place].





In general I would expect with the antibiotics that your pain is improving over 

the next 48 to 72 hours.  If not markedly better, you have increased pain 

swelling fevers or redness then please return to the ER for second evaluation

## 2022-04-02 ENCOUNTER — HOSPITAL ENCOUNTER (EMERGENCY)
Dept: HOSPITAL 76 - ED | Age: 47
Discharge: HOME | End: 2022-04-02
Payer: COMMERCIAL

## 2022-04-02 VITALS — DIASTOLIC BLOOD PRESSURE: 84 MMHG | SYSTOLIC BLOOD PRESSURE: 194 MMHG

## 2022-04-02 DIAGNOSIS — L05.91: Primary | ICD-10-CM

## 2022-04-02 PROCEDURE — 99282 EMERGENCY DEPT VISIT SF MDM: CPT

## 2022-04-02 PROCEDURE — 87181 SC STD AGAR DILUTION PER AGT: CPT

## 2022-04-02 PROCEDURE — 99283 EMERGENCY DEPT VISIT LOW MDM: CPT

## 2022-04-02 PROCEDURE — 87205 SMEAR GRAM STAIN: CPT

## 2022-04-02 PROCEDURE — 87070 CULTURE OTHR SPECIMN AEROBIC: CPT

## 2022-04-02 NOTE — ED PHYSICIAN DOCUMENTATION
PD HPI SKIN





- Stated complaint


Stated Complaint: CYST ON REAR END





- Chief complaint


Chief Complaint: Laceration





- History obtained from


History obtained from: Patient





- History of Present Illness


Timing - onset: How many weeks ago (2)


Timing - duration: Weeks (2)


Timing - details: Gradual onset, Still present


Location: Other (tailbone area with swelling/tender and came to ED 2 weeks ago, 

Dx with pilonidal cyst infected and had incision/drainage and Rx Bactrim abx. He

states tender and swelling decreased but has had persistent mild drainage from 

the wound. Here for recheck.)


Quality / character: Painful (just mildly tender still.), Draining.  No: 

Swelling


Associated symptoms: No: Fever, Myalgias


Contributing factors: No: Recent illness


Recently seen: Emergency Dept (2 weeks ago)





Review of Systems


Constitutional: denies: Fever, Chills


GI: denies: Abdominal Pain, Nausea, Vomiting, Diarrhea


Skin: denies: Rash





PD PAST MEDICAL HISTORY





- Past Medical History


Cardiovascular: Hypertension, High cholesterol


Respiratory: None


Neuro: None


Endocrine/Autoimmune: None


GI: Other (Umbilical hernia)


GYN: None


: None


HEENT: None


Musculoskeletal: Osteoarthritis, Gout (Pt and wife didn't mention, but found hx 

in EHR)


Derm: None





- Past Surgical History


Past Surgical History: Yes


Ortho: Knee replacement


Cardiovascular: Vascular surgery





- Present Medications


Home Medications: 


                                Ambulatory Orders











 Medication  Instructions  Recorded  Confirmed


 


Terbinafine [Lamisil] 250 mg PO DAILY 12/02/19 12/02/19


 


Blood Sugar Diagnostic [[facility] 12/04/19 





Lite Test Strip]   


 


Blood-Glucose Meter [Glucometer] [facility] each [facility] #[facility] each 12/04/19 


 


Insulin Aspart [NovoLOG] 3 - 11 unit SUBQ 12/04/19 





 0800,1200,1700,2100 #2 pen  


 


Insulin Aspart [NovoLOG] 7 unit SUBQ TIDWM #2 pen 12/04/19 


 


Insulin Glargine [Lantus Solostar] 25 unit SUBQ BID #4 pen 12/04/19 


 


Lancets 1 each  ACHS #100 each 12/04/19 


 


Losartan [Cozaar] 50 mg PO DAILY #30 tablet 12/04/19 


 


Pen Needle, Diabetic [Insulin Pen [facility] dis.needle 12/04/19 





Needle]   


 


HYDROcod/ACETAM 5/325 [Norco 5/325] 1 tablet PO BID PRN #10 tablet 03/11/22 


 


Sulfamethox/Trimeth 800/160 1 each PO BID #14 tablet 03/11/22 





[Bactrim Ds 800/160]   


 


Doxycycline Hyclate 100 mg PO BID 7 Days #14 cap 04/02/22 














- Allergies


Allergies/Adverse Reactions: 


                                    Allergies











Allergy/AdvReac Type Severity Reaction Status Date / Time


 


No Known Drug Allergies Allergy   Verified 04/02/22 20:55














- Social History


Does the pt smoke?: No


Smoking Status: Never smoker


Does the pt drink ETOH?: No


Does the pt have substance abuse?: No





- Immunizations


Immunizations are current?: Yes





- POLST


Patient has POLST: No


POLST Status: Full Code





PD ED PE NORMAL





- Vitals


Vital signs reviewed: Yes





- General


General: Alert and oriented X 3, No acute distress, Well developed/nourished





- Derm


Derm: Normal color, Warm and dry, Other (pilonidal area with mild local 

tenderness right of center, open prior I&D wound, but no redness nor warmth. 

Palpation around the site caused just few drops of clear fluid out. This was 

cultured. )





Results





- Vitals


Vitals: 


                               Vital Signs - 24 hr











  04/02/22





  20:45


 


Temperature 36.5 C


 


Heart Rate 85


 


Respiratory 18





Rate 


 


Blood Pressure 194/84 H


 


O2 Saturation 100








                                     Oxygen











O2 Source                      Room air

















- Labs


Labs: 


                                  Microbiology











 04/02/22 21:12 Wound Culture - Preliminary





 Buttock - Right 














PD MEDICAL DECISION MAKING





- ED course


Complexity details: considered differential (seems like persistent serous 

drainage from cyst site. Consider persistent infection and can Rx Doxy pending 

culture. Referred to Surgery as likely will just need excision of underlying 

cyst. ), d/w patient





Departure





- Departure


Disposition: 01 Home, Self Care


Clinical Impression: 


 Pilonidal cyst without abscess





Condition: Stable


Record reviewed to determine appropriate education?: Yes


Follow-Up: 


KEDAR ALMEIDA DO [Primary Care Provider] - 


Trey Cruz MD [Provider Admit Priv/Credential] - 


Prescriptions: 


Doxycycline Hyclate 100 mg PO BID 7 Days #14 cap


Comments: 


The pilonidal cyst area does not look obviously infected but I would be 

concerned there may be a mild infection which is causing it to not heal readily.

 Other consideration would be just the persistent drainage has created a scarred

tract that just needs excision.





We did do a culture that will result in 2 to 3 days.  That will tell us if there

is a current infection still.  I would go with doxycycline antibiotic twice 

daily in case of persistent infection.  Tylenol or ibuprofen if needed for 

pains.  Continue with your other usual medicines.





Subsequently following up with general surgery for excision of the cyst area if 

this is not healing well and is not infected.  I did provide a name of one of 

the surgeons locally.





I transmitted your prescription to [facility] pharmacy.


Discharge Date/Time: 04/02/22 21:33

## 2022-10-05 ENCOUNTER — HOSPITAL ENCOUNTER (EMERGENCY)
Dept: HOSPITAL 76 - ED | Age: 47
Discharge: HOME | End: 2022-10-05
Payer: COMMERCIAL

## 2022-10-05 VITALS — DIASTOLIC BLOOD PRESSURE: 109 MMHG | SYSTOLIC BLOOD PRESSURE: 216 MMHG

## 2022-10-05 DIAGNOSIS — I10: ICD-10-CM

## 2022-10-05 DIAGNOSIS — S39.012A: Primary | ICD-10-CM

## 2022-10-05 DIAGNOSIS — Z79.4: ICD-10-CM
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
## 2022-10-05 DIAGNOSIS — E11.9: ICD-10-CM

## 2022-10-05 PROCEDURE — 99283 EMERGENCY DEPT VISIT LOW MDM: CPT

## 2022-10-05 PROCEDURE — 96372 THER/PROPH/DIAG INJ SC/IM: CPT

## 2022-10-05 PROCEDURE — 99282 EMERGENCY DEPT VISIT SF MDM: CPT

## 2022-11-13 ENCOUNTER — HOSPITAL ENCOUNTER (EMERGENCY)
Dept: HOSPITAL 76 - ED | Age: 47
Discharge: HOME | End: 2022-11-13
Payer: COMMERCIAL

## 2022-11-13 VITALS — DIASTOLIC BLOOD PRESSURE: 115 MMHG | SYSTOLIC BLOOD PRESSURE: 147 MMHG

## 2022-11-13 DIAGNOSIS — I10: ICD-10-CM

## 2022-11-13 DIAGNOSIS — Z79.4: ICD-10-CM

## 2022-11-13 DIAGNOSIS — E11.9: ICD-10-CM

## 2022-11-13 DIAGNOSIS — L30.9: Primary | ICD-10-CM

## 2022-11-13 PROCEDURE — 99281 EMR DPT VST MAYX REQ PHY/QHP: CPT

## 2022-11-13 PROCEDURE — 99283 EMERGENCY DEPT VISIT LOW MDM: CPT

## 2022-11-13 NOTE — ED PHYSICIAN DOCUMENTATION
PD HPI SKIN





- Stated complaint


Stated Complaint: ITCHY SKIN





- Chief complaint


Chief Complaint: Allergic Rx





- History obtained from


History obtained from: Patient





- Additional information


Additional information: 


Pt is a 46 yo IDDM presenting for evaluation of rash to various parts of his 

body since Thursday. He has recently seen a dermatologist for eczema. He is 

using cetaphil soap and lotion but still has clothes washed in scented 

detergent. He denies new exposures. He recently restarted his insulin. He has 

tried OTC hydrocortisone. The areas are not worsening but have not significantly

improved. 











Review of Systems


Constitutional: denies: Fever


Cardiac: denies: Chest pain / pressure


Respiratory: denies: Dyspnea


GI: denies: Abdominal Pain, Vomiting


Skin: reports: Rash


Musculoskeletal: denies: Extremity pain





PD PAST MEDICAL HISTORY





- Past Medical History


Cardiovascular: Hypertension, High cholesterol


Respiratory: None


Neuro: None


Endocrine/Autoimmune: None


GI: Other (Umbilical hernia)


GYN: None


: None


HEENT: None


Musculoskeletal: Osteoarthritis, Gout (Pt and wife didn't mention, but found hx 

in EHR)


Derm: None





- Past Surgical History


Past Surgical History: Yes


Ortho: Knee replacement


Cardiovascular: Vascular surgery





- Present Medications


Home Medications: 


                                Ambulatory Orders











 Medication  Instructions  Recorded  Confirmed


 


Terbinafine [Lamisil] 250 mg PO DAILY 12/02/19 12/02/19


 


Blood Sugar Diagnostic [Freestyle 1 each  ACHS #100 strip 12/04/19 





Lite Test Strip]   


 


Blood-Glucose Meter [Glucometer] 1 each  ACHS #1 each 12/04/19 


 


Insulin Aspart [NovoLOG] 3 - 11 unit SUBQ 12/04/19 





 0800,1200,1700,2100 #2 pen  


 


Insulin Aspart [NovoLOG] 7 unit SUBQ TIDWM #2 pen 12/04/19 


 


Insulin Glargine [Lantus Solostar] 25 unit SUBQ BID #4 pen 12/04/19 


 


Lancets 1 each  ACHS #100 each 12/04/19 


 


Losartan [Cozaar] 50 mg PO DAILY #30 tablet 12/04/19 


 


Pen Needle, Diabetic [Insulin Pen 1 each  ACHS #100 dis.needle 12/04/19 





Needle]   


 


HYDROcod/ACETAM 5/325 [Norco 5/325] 1 tablet PO BID PRN #10 tablet 03/11/22 


 


Sulfamethox/Trimeth 800/160 1 each PO BID #14 tablet 03/11/22 





[Bactrim Ds 800/160]   


 


Doxycycline Hyclate 100 mg PO BID 7 Days #14 cap 04/02/22 


 


methocarbamoL [Robaxin] 500 mg PO Q6H #20 tablet 10/05/22 














- Allergies


Allergies/Adverse Reactions: 


                                    Allergies











Allergy/AdvReac Type Severity Reaction Status Date / Time


 


No Known Drug Allergies Allergy   Verified 11/13/22 09:17














- Social History


Does the pt smoke?: No


Smoking Status: Never smoker


Does the pt drink ETOH?: No


Does the pt have substance abuse?: No





- Immunizations


Immunizations are current?: Yes





- POLST


Patient has POLST: No


POLST Status: Full Code





PD ED PE NORMAL





- General


General: Alert and oriented X 3, No acute distress, Well developed/nourished





- HEENT


HEENT: Atraumatic





- Neck


Neck: Supple, no meningeal sign





- Respiratory


Respiratory: No respiratory distress





- Derm


Derm: Other (Patches of raised erythema to R antecubital; b/l thighs. Reports 

hands are itchy; Skin is dry and fine lines on dorsum of hands appear white)





Results





- Vitals


Vitals: 


                               Vital Signs - 24 hr











  11/13/22





  09:12


 


Temperature 37.4 C


 


Heart Rate 82


 


Respiratory 16





Rate 


 


Blood Pressure 147/115 H


 


O2 Saturation 97








                                     Oxygen











O2 Source                      Room air

















PD MEDICAL DECISION MAKING





- ED course


ED course: 


Pt with itchiness to skin and various areas of raised redness. Pt has history of

 eczema. Skin appears dry and rash could be from eczema. Recommend avoiding any 

fragranced items and to ensure lotions/soaps are for those with eczema; 

increased moisturization of skin, and topical steroid BID. Pt advised to follow 

up with dermatologist. No signs of infection or mucous membrane involvement. 








Departure





- Departure


Disposition: 01 Home, Self Care


Clinical Impression: 


 Dermatitis





Condition: Stable


Instructions:  ED Dermatitis Atopic Eczema


Comments: 


The exact cause of your rash is unclear but could be related to your underlying 

eczema.  I would recommend making sure you are keeping your skin moisturized, 

Avoiding any lotions, soaps or detergents with fragrance including laundry 

detergent and dryer sheets as this may make your eczema flareup. Please look up 

at labels for lotions or soaps or use those recommended by your dermatologist. 

You can use Hydrocortisone 1% over-the-counter twice a day to any areas that are

 irritated as well as Benadryl for itching.  If your symptoms are worsening in 

any way then consider return to the ER but otherwise I would recommend close 

follow-up with your dermatologist.


Discharge Date/Time: 11/13/22 10:31

## 2022-11-18 ENCOUNTER — HOSPITAL ENCOUNTER (OUTPATIENT)
Dept: HOSPITAL 76 - SC | Age: 47
Discharge: HOME | End: 2022-11-18
Attending: NURSE PRACTITIONER
Payer: COMMERCIAL

## 2022-11-18 VITALS — DIASTOLIC BLOOD PRESSURE: 82 MMHG | SYSTOLIC BLOOD PRESSURE: 142 MMHG

## 2022-11-18 DIAGNOSIS — E66.01: ICD-10-CM

## 2022-11-18 DIAGNOSIS — G47.33: Primary | ICD-10-CM

## 2022-11-18 PROCEDURE — 99212 OFFICE O/P EST SF 10 MIN: CPT

## 2022-11-18 NOTE — SLEEP CARE CONSULTATION
Information from patient questionnaire entered by Esperanza Santoro.





I have reviewed and concur with the information entered by Esperanza Santoro. This 

document represents the service I personally performed and the decisions made by

me, Yadira Pringle ARNP.





History of Present Illness


Service Date and Time: 11/18/2022    0848


Previous diagnosis: Moderate, Obstructive Sleep Apnea-Hypopnea Syndrome


AHI: 26.6 (in 2021)


Reason for follow up: annual (LAST SEEN 09/2021)


Equipment type: CPAP (RESMED)


Equipment obtained from: Other ([facility]; getting supplies as 

needed)


Mask style: Full face


Backup mask available: Yes (old mask)


Last cushion change: 1 month


Prior sleep studies: Yes


Year and Where: 2021 - [facility]


Type of Sleep Study: Home sleep study


HPI additional information: 





NAZ BRUSH was diagnosed to have moderate, AHI 26.6, obstructive sleep apnea-

hypopnea syndrome and returned today for CPAP therapy annual follow-up.





Sleep Study





- Results


Type of Sleep Study: Home sleep study


Prior sleep studies: Yes


Year and Where: 2021 - [facility]





CPAP Compliance Data





- Data Reviewed with Patient


Average duration of nightly device use: 4 HRS 55 MIN


Compliance rate %: 94 (05/13/22-11/08/22; 175/180 days used)


Current pressure setting (cmH2O): 13-15


Average residual AHI: 0.6


Central apnea: 0.0


Obstructive apnea: 0.0


Hypopnea: 0.6


Average large leak: 3.9 lpm





Subjective


Missed days of use due to: reports: mask issues


Patient concerns: reports: dry mouth, nose, throat (occasional).  denies: 

aerophagia, mask discomfort, air blowing in eyes, mask leak noise, condensation 

in mask/hose, nasal congestion, epistaxis


Observed to snore while using device: No


Current pressure setting perceived as: comfortable


On therapy, patient: reports: sleeping better, awakening more refreshed, being 

more awake and alert during the day, more rested overall.  denies: drowsiness 

while driving


Initial [place] Sleepiness Scale score: 9 (in 2020)


Current [place] Sleepiness Scale score: 9 (11/18/2022)





Allergies and Home Medications


Drug allergies reviewed: Yes (NKDA)


Home medication list reviewed: Yes (no changes)


Allergy and home medication list: 


Allergies





No Known Drug Allergies Allergy (Verified 11/13/22 09:17)





Review of Systems


Review of systems same as previous: Yes (no changes)





Physical Exam


Vital signs obtained and entered by: ESPERANZA PIÑA MA


Blood Pressure: 142/82 (LEFT ARM)


Cuff size: long


Heart Rate: 78


O2 Saturation: 98


Height: 6 ft 5 in


Weight: 348 lb


Body Mass Index: 41.2


BMI Classification: Morbidly Obese





Impression and Plan





1. Obstructive Sleep Apnea-Hypopnea Syndrome, moderate, with good treatment 

compliance and good apnea control. On CPAP therapy, the patient has better sleep

quality and is more rested overall. Patient has significant improvement of their

sleep apnea and are satisfied with current CPAP therapy.  Patient denies 

problems with oral dryness, nasal congestion, epistaxis, skin irritation or 

aerophagia. Patient's apnea severity and rationale for treatment to reduce 

apnea, improve sleep quality and reduce cardiovascular and cerebrovascular 

events was reviewed. I also reviewed the benefit of consistent device use of 

CPAP for hypertension and diabetes.





2. Obesity, unspecified. Currently patients BMI is 41.2.  Obesity increases the

risk of apnea, CPAP pressure requirements and overall health risks especially 

cardiovascular and diabetes. Thus patient is advised to lose weight. The p

atient's CPAP pressure range should accommodate some weight loss. Symptoms to 

report for additional pressure adjustment discussed.





* Continue auto CPAP pressure at 13-15 cmH2O


* Update supplies


* Notify me if snoring with mask or feeling that the pressure is too much or too

  little


* Attempt to lose weight


* Call this office if any problems using CPAP


* Return for follow up in 1 year, or sooner if concerns arise





Counseling Topics: Spare mask, Weight loss health impact


Visit Type: In Office


Time Spent with Patient (minutes): 11


Provider Statement: I spent 100% of the Face to Face Visit with the patient with

greater than 50% spent counseling the patient and coordination of care.